# Patient Record
Sex: FEMALE | Race: WHITE | Employment: OTHER | URBAN - METROPOLITAN AREA
[De-identification: names, ages, dates, MRNs, and addresses within clinical notes are randomized per-mention and may not be internally consistent; named-entity substitution may affect disease eponyms.]

---

## 2024-08-09 ENCOUNTER — APPOINTMENT (OUTPATIENT)
Dept: CT IMAGING | Age: 89
DRG: 481 | End: 2024-08-09
Payer: COMMERCIAL

## 2024-08-09 ENCOUNTER — ANESTHESIA (OUTPATIENT)
Dept: OPERATING ROOM | Age: 89
End: 2024-08-09
Payer: COMMERCIAL

## 2024-08-09 ENCOUNTER — APPOINTMENT (OUTPATIENT)
Dept: GENERAL RADIOLOGY | Age: 89
DRG: 481 | End: 2024-08-09
Payer: COMMERCIAL

## 2024-08-09 ENCOUNTER — HOSPITAL ENCOUNTER (INPATIENT)
Age: 89
LOS: 5 days | Discharge: NURSING, REHAB OR LONG-TERM CARE | DRG: 481 | End: 2024-08-14
Attending: FAMILY MEDICINE | Admitting: FAMILY MEDICINE
Payer: COMMERCIAL

## 2024-08-09 ENCOUNTER — ANESTHESIA EVENT (OUTPATIENT)
Dept: OPERATING ROOM | Age: 89
End: 2024-08-09
Payer: COMMERCIAL

## 2024-08-09 DIAGNOSIS — K11.8 PAROTID NODULE: ICD-10-CM

## 2024-08-09 DIAGNOSIS — I71.43 INFRARENAL ABDOMINAL AORTIC ANEURYSM (AAA) WITHOUT RUPTURE (HCC): ICD-10-CM

## 2024-08-09 DIAGNOSIS — Z72.0 TOBACCO USE: ICD-10-CM

## 2024-08-09 DIAGNOSIS — J44.1 COPD WITH ACUTE EXACERBATION (HCC): ICD-10-CM
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
## 2024-08-09 DIAGNOSIS — J96.02 ACUTE RESPIRATORY FAILURE WITH HYPOXIA AND HYPERCAPNIA (HCC): ICD-10-CM

## 2024-08-09 DIAGNOSIS — J96.01 ACUTE RESPIRATORY FAILURE WITH HYPOXIA AND HYPERCAPNIA (HCC): ICD-10-CM

## 2024-08-09 DIAGNOSIS — S32.040A CLOSED COMPRESSION FRACTURE OF L4 LUMBAR VERTEBRA, INITIAL ENCOUNTER (HCC): ICD-10-CM

## 2024-08-09 DIAGNOSIS — S72.001A CLOSED FRACTURE OF NECK OF RIGHT FEMUR, INITIAL ENCOUNTER (HCC): ICD-10-CM

## 2024-08-09 LAB
ALBUMIN SERPL-MCNC: 4.1 G/DL (ref 3.4–5)
ALBUMIN/GLOB SERPL: 1.4 {RATIO} (ref 1.1–2.2)
ALP SERPL-CCNC: 83 U/L (ref 40–129)
ALT SERPL-CCNC: 16 U/L (ref 10–40)
ANION GAP SERPL CALCULATED.3IONS-SCNC: 11 MMOL/L (ref 3–16)
AST SERPL-CCNC: 23 U/L (ref 15–37)
BASE EXCESS BLDV CALC-SCNC: 2.4 MMOL/L
BASOPHILS # BLD: 0 K/UL (ref 0–0.2)
BASOPHILS NFR BLD: 0.3 %
BILIRUB SERPL-MCNC: 0.5 MG/DL (ref 0–1)
BUN SERPL-MCNC: 24 MG/DL (ref 7–20)
CALCIUM SERPL-MCNC: 9.5 MG/DL (ref 8.3–10.6)
CHLORIDE SERPL-SCNC: 105 MMOL/L (ref 99–110)
CK SERPL-CCNC: 31 U/L (ref 26–192)
CO2 BLDV-SCNC: 31 MMOL/L
CO2 SERPL-SCNC: 27 MMOL/L (ref 21–32)
COHGB MFR BLDV: 2 %
CREAT SERPL-MCNC: 0.7 MG/DL (ref 0.6–1.2)
DEPRECATED RDW RBC AUTO: 17.5 % (ref 12.4–15.4)
EKG ATRIAL RATE: 70 BPM
EKG DIAGNOSIS: NORMAL
EKG P AXIS: 80 DEGREES
EKG P-R INTERVAL: 120 MS
EKG Q-T INTERVAL: 436 MS
EKG QRS DURATION: 72 MS
EKG QTC CALCULATION (BAZETT): 470 MS
EKG R AXIS: 66 DEGREES
EKG T AXIS: 182 DEGREES
EKG VENTRICULAR RATE: 70 BPM
EOSINOPHIL # BLD: 0 K/UL (ref 0–0.6)
EOSINOPHIL NFR BLD: 0.3 %
GFR SERPLBLD CREATININE-BSD FMLA CKD-EPI: 83 ML/MIN/{1.73_M2}
GLUCOSE SERPL-MCNC: 104 MG/DL (ref 70–99)
HCO3 BLDV-SCNC: 29 MMOL/L (ref 23–29)
HCT VFR BLD AUTO: 39.8 % (ref 36–48)
HGB BLD-MCNC: 12.9 G/DL (ref 12–16)
LYMPHOCYTES # BLD: 1.4 K/UL (ref 1–5.1)
LYMPHOCYTES NFR BLD: 12.3 %
MCH RBC QN AUTO: 29.1 PG (ref 26–34)
MCHC RBC AUTO-ENTMCNC: 32.5 G/DL (ref 31–36)
MCV RBC AUTO: 89.5 FL (ref 80–100)
METHGB MFR BLDV: 0.3 %
MONOCYTES # BLD: 0.6 K/UL (ref 0–1.3)
MONOCYTES NFR BLD: 5.6 %
NEUTROPHILS # BLD: 9.2 K/UL (ref 1.7–7.7)
NEUTROPHILS NFR BLD: 81.5 %
NT-PROBNP SERPL-MCNC: 690 PG/ML (ref 0–449)
O2 THERAPY: ABNORMAL
PCO2 BLDV: 53.3 MMHG (ref 40–50)
PH BLDV: 7.35 [PH] (ref 7.35–7.45)
PLATELET # BLD AUTO: 259 K/UL (ref 135–450)
PMV BLD AUTO: 8 FL (ref 5–10.5)
PO2 BLDV: <30 MMHG
POTASSIUM SERPL-SCNC: 3.8 MMOL/L (ref 3.5–5.1)
PROT SERPL-MCNC: 7 G/DL (ref 6.4–8.2)
RBC # BLD AUTO: 4.44 M/UL (ref 4–5.2)
SAO2 % BLDV: 13 %
SARS-COV-2 RDRP RESP QL NAA+PROBE: NOT DETECTED
SODIUM SERPL-SCNC: 143 MMOL/L (ref 136–145)
TROPONIN, HIGH SENSITIVITY: 19 NG/L (ref 0–14)
TROPONIN, HIGH SENSITIVITY: 22 NG/L (ref 0–14)
WBC # BLD AUTO: 11.3 K/UL (ref 4–11)

## 2024-08-09 PROCEDURE — 96375 TX/PRO/DX INJ NEW DRUG ADDON: CPT

## 2024-08-09 PROCEDURE — 0QH634Z INSERTION OF INTERNAL FIXATION DEVICE INTO RIGHT UPPER FEMUR, PERCUTANEOUS APPROACH: ICD-10-PCS | Performed by: ORTHOPAEDIC SURGERY

## 2024-08-09 PROCEDURE — 2580000003 HC RX 258: Performed by: ORTHOPAEDIC SURGERY

## 2024-08-09 PROCEDURE — 84484 ASSAY OF TROPONIN QUANT: CPT

## 2024-08-09 PROCEDURE — 3700000001 HC ADD 15 MINUTES (ANESTHESIA): Performed by: ORTHOPAEDIC SURGERY

## 2024-08-09 PROCEDURE — 94761 N-INVAS EAR/PLS OXIMETRY MLT: CPT

## 2024-08-09 PROCEDURE — 82803 BLOOD GASES ANY COMBINATION: CPT

## 2024-08-09 PROCEDURE — 93010 ELECTROCARDIOGRAM REPORT: CPT | Performed by: INTERNAL MEDICINE

## 2024-08-09 PROCEDURE — 71045 X-RAY EXAM CHEST 1 VIEW: CPT

## 2024-08-09 PROCEDURE — 73552 X-RAY EXAM OF FEMUR 2/>: CPT

## 2024-08-09 PROCEDURE — 6370000000 HC RX 637 (ALT 250 FOR IP): Performed by: GENERAL ACUTE CARE HOSPITAL

## 2024-08-09 PROCEDURE — 99285 EMERGENCY DEPT VISIT HI MDM: CPT

## 2024-08-09 PROCEDURE — 2709999900 HC NON-CHARGEABLE SUPPLY: Performed by: ORTHOPAEDIC SURGERY

## 2024-08-09 PROCEDURE — 3600000004 HC SURGERY LEVEL 4 BASE: Performed by: ORTHOPAEDIC SURGERY

## 2024-08-09 PROCEDURE — A4217 STERILE WATER/SALINE, 500 ML: HCPCS | Performed by: ORTHOPAEDIC SURGERY

## 2024-08-09 PROCEDURE — 93005 ELECTROCARDIOGRAM TRACING: CPT | Performed by: GENERAL ACUTE CARE HOSPITAL

## 2024-08-09 PROCEDURE — 70450 CT HEAD/BRAIN W/O DYE: CPT

## 2024-08-09 PROCEDURE — 7100000000 HC PACU RECOVERY - FIRST 15 MIN: Performed by: ORTHOPAEDIC SURGERY

## 2024-08-09 PROCEDURE — C1713 ANCHOR/SCREW BN/BN,TIS/BN: HCPCS | Performed by: ORTHOPAEDIC SURGERY

## 2024-08-09 PROCEDURE — 87635 SARS-COV-2 COVID-19 AMP PRB: CPT

## 2024-08-09 PROCEDURE — 27235 TREAT THIGH FRACTURE: CPT | Performed by: ORTHOPAEDIC SURGERY

## 2024-08-09 PROCEDURE — 80053 COMPREHEN METABOLIC PANEL: CPT

## 2024-08-09 PROCEDURE — 72192 CT PELVIS W/O DYE: CPT

## 2024-08-09 PROCEDURE — 3700000000 HC ANESTHESIA ATTENDED CARE: Performed by: ORTHOPAEDIC SURGERY

## 2024-08-09 PROCEDURE — 5A09357 ASSISTANCE WITH RESPIRATORY VENTILATION, LESS THAN 24 CONSECUTIVE HOURS, CONTINUOUS POSITIVE AIRWAY PRESSURE: ICD-10-PCS | Performed by: FAMILY MEDICINE

## 2024-08-09 PROCEDURE — 6370000000 HC RX 637 (ALT 250 FOR IP): Performed by: ANESTHESIOLOGY

## 2024-08-09 PROCEDURE — 99223 1ST HOSP IP/OBS HIGH 75: CPT | Performed by: ORTHOPAEDIC SURGERY

## 2024-08-09 PROCEDURE — 73502 X-RAY EXAM HIP UNI 2-3 VIEWS: CPT

## 2024-08-09 PROCEDURE — 2580000003 HC RX 258

## 2024-08-09 PROCEDURE — 72125 CT NECK SPINE W/O DYE: CPT

## 2024-08-09 PROCEDURE — 85025 COMPLETE CBC W/AUTO DIFF WBC: CPT

## 2024-08-09 PROCEDURE — 2500000003 HC RX 250 WO HCPCS

## 2024-08-09 PROCEDURE — 3600000014 HC SURGERY LEVEL 4 ADDTL 15MIN: Performed by: ORTHOPAEDIC SURGERY

## 2024-08-09 PROCEDURE — 2580000003 HC RX 258: Performed by: GENERAL ACUTE CARE HOSPITAL

## 2024-08-09 PROCEDURE — 6360000002 HC RX W HCPCS: Performed by: ORTHOPAEDIC SURGERY

## 2024-08-09 PROCEDURE — 96374 THER/PROPH/DIAG INJ IV PUSH: CPT

## 2024-08-09 PROCEDURE — 2700000000 HC OXYGEN THERAPY PER DAY

## 2024-08-09 PROCEDURE — 94640 AIRWAY INHALATION TREATMENT: CPT

## 2024-08-09 PROCEDURE — 83880 ASSAY OF NATRIURETIC PEPTIDE: CPT

## 2024-08-09 PROCEDURE — 6360000002 HC RX W HCPCS

## 2024-08-09 PROCEDURE — 7100000001 HC PACU RECOVERY - ADDTL 15 MIN: Performed by: ORTHOPAEDIC SURGERY

## 2024-08-09 PROCEDURE — 94150 VITAL CAPACITY TEST: CPT

## 2024-08-09 PROCEDURE — 1200000000 HC SEMI PRIVATE

## 2024-08-09 PROCEDURE — 82550 ASSAY OF CK (CPK): CPT

## 2024-08-09 PROCEDURE — 6360000002 HC RX W HCPCS: Performed by: GENERAL ACUTE CARE HOSPITAL

## 2024-08-09 PROCEDURE — C1769 GUIDE WIRE: HCPCS | Performed by: ORTHOPAEDIC SURGERY

## 2024-08-09 PROCEDURE — 96361 HYDRATE IV INFUSION ADD-ON: CPT

## 2024-08-09 DEVICE — CANNULATED SCREW
Type: IMPLANTABLE DEVICE | Site: HIP | Status: FUNCTIONAL
Brand: ASNIS

## 2024-08-09 RX ORDER — SODIUM CHLORIDE 9 MG/ML
INJECTION, SOLUTION INTRAVENOUS PRN
Status: DISCONTINUED | OUTPATIENT
Start: 2024-08-09 | End: 2024-08-10 | Stop reason: SDUPTHER

## 2024-08-09 RX ORDER — SODIUM CHLORIDE 0.9 % (FLUSH) 0.9 %
5-40 SYRINGE (ML) INJECTION EVERY 12 HOURS SCHEDULED
Status: DISCONTINUED | OUTPATIENT
Start: 2024-08-09 | End: 2024-08-09 | Stop reason: HOSPADM

## 2024-08-09 RX ORDER — SODIUM CHLORIDE 0.9 % (FLUSH) 0.9 %
5-40 SYRINGE (ML) INJECTION EVERY 12 HOURS SCHEDULED
Status: DISCONTINUED | OUTPATIENT
Start: 2024-08-09 | End: 2024-08-10 | Stop reason: SDUPTHER

## 2024-08-09 RX ORDER — ENOXAPARIN SODIUM 100 MG/ML
40 INJECTION SUBCUTANEOUS DAILY
Status: DISCONTINUED | OUTPATIENT
Start: 2024-08-09 | End: 2024-08-09 | Stop reason: DRUGHIGH

## 2024-08-09 RX ORDER — ACETAMINOPHEN 650 MG/1
650 SUPPOSITORY RECTAL EVERY 6 HOURS PRN
Status: DISCONTINUED | OUTPATIENT
Start: 2024-08-09 | End: 2024-08-14 | Stop reason: HOSPADM

## 2024-08-09 RX ORDER — SODIUM CHLORIDE 0.9 % (FLUSH) 0.9 %
5-40 SYRINGE (ML) INJECTION EVERY 12 HOURS SCHEDULED
Status: DISCONTINUED | OUTPATIENT
Start: 2024-08-09 | End: 2024-08-14 | Stop reason: HOSPADM

## 2024-08-09 RX ORDER — SODIUM CHLORIDE 0.9 % (FLUSH) 0.9 %
5-40 SYRINGE (ML) INJECTION PRN
Status: DISCONTINUED | OUTPATIENT
Start: 2024-08-09 | End: 2024-08-09 | Stop reason: HOSPADM

## 2024-08-09 RX ORDER — IPRATROPIUM BROMIDE AND ALBUTEROL SULFATE 2.5; .5 MG/3ML; MG/3ML
1 SOLUTION RESPIRATORY (INHALATION) ONCE
Status: COMPLETED | OUTPATIENT
Start: 2024-08-09 | End: 2024-08-09

## 2024-08-09 RX ORDER — MAGNESIUM SULFATE IN WATER 40 MG/ML
2000 INJECTION, SOLUTION INTRAVENOUS PRN
Status: DISCONTINUED | OUTPATIENT
Start: 2024-08-09 | End: 2024-08-14 | Stop reason: HOSPADM

## 2024-08-09 RX ORDER — 0.9 % SODIUM CHLORIDE 0.9 %
500 INTRAVENOUS SOLUTION INTRAVENOUS ONCE
Status: COMPLETED | OUTPATIENT
Start: 2024-08-09 | End: 2024-08-09

## 2024-08-09 RX ORDER — POLYETHYLENE GLYCOL 3350 17 G/17G
17 POWDER, FOR SOLUTION ORAL DAILY PRN
Status: DISCONTINUED | OUTPATIENT
Start: 2024-08-09 | End: 2024-08-14 | Stop reason: HOSPADM

## 2024-08-09 RX ORDER — SODIUM CHLORIDE 9 MG/ML
INJECTION, SOLUTION INTRAVENOUS CONTINUOUS PRN
Status: DISCONTINUED | OUTPATIENT
Start: 2024-08-09 | End: 2024-08-09 | Stop reason: SDUPTHER

## 2024-08-09 RX ORDER — NALOXONE HYDROCHLORIDE 0.4 MG/ML
INJECTION, SOLUTION INTRAMUSCULAR; INTRAVENOUS; SUBCUTANEOUS PRN
Status: DISCONTINUED | OUTPATIENT
Start: 2024-08-09 | End: 2024-08-09 | Stop reason: HOSPADM

## 2024-08-09 RX ORDER — DEXAMETHASONE SODIUM PHOSPHATE 4 MG/ML
10 INJECTION, SOLUTION INTRA-ARTICULAR; INTRALESIONAL; INTRAMUSCULAR; INTRAVENOUS; SOFT TISSUE ONCE
Status: COMPLETED | OUTPATIENT
Start: 2024-08-09 | End: 2024-08-09

## 2024-08-09 RX ORDER — ACETAMINOPHEN 325 MG/1
650 TABLET ORAL EVERY 6 HOURS PRN
Status: DISCONTINUED | OUTPATIENT
Start: 2024-08-09 | End: 2024-08-14 | Stop reason: HOSPADM

## 2024-08-09 RX ORDER — FENTANYL CITRATE 50 UG/ML
INJECTION, SOLUTION INTRAMUSCULAR; INTRAVENOUS PRN
Status: DISCONTINUED | OUTPATIENT
Start: 2024-08-09 | End: 2024-08-09 | Stop reason: SDUPTHER

## 2024-08-09 RX ORDER — ENOXAPARIN SODIUM 100 MG/ML
30 INJECTION SUBCUTANEOUS DAILY
Status: DISCONTINUED | OUTPATIENT
Start: 2024-08-09 | End: 2024-08-11

## 2024-08-09 RX ORDER — ONDANSETRON 4 MG/1
4 TABLET, ORALLY DISINTEGRATING ORAL EVERY 8 HOURS PRN
Status: DISCONTINUED | OUTPATIENT
Start: 2024-08-09 | End: 2024-08-14 | Stop reason: HOSPADM

## 2024-08-09 RX ORDER — ENOXAPARIN SODIUM 100 MG/ML
30 INJECTION SUBCUTANEOUS DAILY
Status: DISCONTINUED | OUTPATIENT
Start: 2024-08-10 | End: 2024-08-09

## 2024-08-09 RX ORDER — PROPOFOL 10 MG/ML
INJECTION, EMULSION INTRAVENOUS PRN
Status: DISCONTINUED | OUTPATIENT
Start: 2024-08-09 | End: 2024-08-09 | Stop reason: SDUPTHER

## 2024-08-09 RX ORDER — BUDESONIDE 0.5 MG/2ML
0.5 INHALANT ORAL
Status: DISCONTINUED | OUTPATIENT
Start: 2024-08-09 | End: 2024-08-14 | Stop reason: HOSPADM

## 2024-08-09 RX ORDER — SUCCINYLCHOLINE CHLORIDE 20 MG/ML
INJECTION INTRAMUSCULAR; INTRAVENOUS PRN
Status: DISCONTINUED | OUTPATIENT
Start: 2024-08-09 | End: 2024-08-09 | Stop reason: SDUPTHER

## 2024-08-09 RX ORDER — ROCURONIUM BROMIDE 10 MG/ML
INJECTION, SOLUTION INTRAVENOUS PRN
Status: DISCONTINUED | OUTPATIENT
Start: 2024-08-09 | End: 2024-08-09 | Stop reason: SDUPTHER

## 2024-08-09 RX ORDER — MORPHINE SULFATE 2 MG/ML
2 INJECTION, SOLUTION INTRAMUSCULAR; INTRAVENOUS ONCE
Status: COMPLETED | OUTPATIENT
Start: 2024-08-09 | End: 2024-08-09

## 2024-08-09 RX ORDER — POTASSIUM CHLORIDE 20 MEQ/1
40 TABLET, EXTENDED RELEASE ORAL PRN
Status: DISCONTINUED | OUTPATIENT
Start: 2024-08-09 | End: 2024-08-14 | Stop reason: HOSPADM

## 2024-08-09 RX ORDER — SODIUM CHLORIDE 0.9 % (FLUSH) 0.9 %
5-40 SYRINGE (ML) INJECTION PRN
Status: DISCONTINUED | OUTPATIENT
Start: 2024-08-09 | End: 2024-08-14 | Stop reason: HOSPADM

## 2024-08-09 RX ORDER — FENTANYL CITRATE 0.05 MG/ML
25 INJECTION, SOLUTION INTRAMUSCULAR; INTRAVENOUS EVERY 5 MIN PRN
Status: DISCONTINUED | OUTPATIENT
Start: 2024-08-09 | End: 2024-08-09 | Stop reason: HOSPADM

## 2024-08-09 RX ORDER — LIDOCAINE HYDROCHLORIDE 20 MG/ML
INJECTION, SOLUTION INFILTRATION; PERINEURAL PRN
Status: DISCONTINUED | OUTPATIENT
Start: 2024-08-09 | End: 2024-08-09 | Stop reason: SDUPTHER

## 2024-08-09 RX ORDER — POTASSIUM CHLORIDE 7.45 MG/ML
10 INJECTION INTRAVENOUS PRN
Status: DISCONTINUED | OUTPATIENT
Start: 2024-08-09 | End: 2024-08-14 | Stop reason: HOSPADM

## 2024-08-09 RX ORDER — DEXAMETHASONE SODIUM PHOSPHATE 4 MG/ML
INJECTION, SOLUTION INTRA-ARTICULAR; INTRALESIONAL; INTRAMUSCULAR; INTRAVENOUS; SOFT TISSUE PRN
Status: DISCONTINUED | OUTPATIENT
Start: 2024-08-09 | End: 2024-08-09 | Stop reason: SDUPTHER

## 2024-08-09 RX ORDER — ONDANSETRON 2 MG/ML
INJECTION INTRAMUSCULAR; INTRAVENOUS PRN
Status: DISCONTINUED | OUTPATIENT
Start: 2024-08-09 | End: 2024-08-09 | Stop reason: SDUPTHER

## 2024-08-09 RX ORDER — SODIUM CHLORIDE 9 MG/ML
INJECTION, SOLUTION INTRAVENOUS PRN
Status: DISCONTINUED | OUTPATIENT
Start: 2024-08-09 | End: 2024-08-14 | Stop reason: HOSPADM

## 2024-08-09 RX ORDER — SODIUM CHLORIDE 0.9 % (FLUSH) 0.9 %
5-40 SYRINGE (ML) INJECTION PRN
Status: DISCONTINUED | OUTPATIENT
Start: 2024-08-09 | End: 2024-08-10 | Stop reason: SDUPTHER

## 2024-08-09 RX ORDER — MAGNESIUM HYDROXIDE 1200 MG/15ML
LIQUID ORAL CONTINUOUS PRN
Status: COMPLETED | OUTPATIENT
Start: 2024-08-09 | End: 2024-08-09

## 2024-08-09 RX ORDER — ONDANSETRON 2 MG/ML
4 INJECTION INTRAMUSCULAR; INTRAVENOUS
Status: DISCONTINUED | OUTPATIENT
Start: 2024-08-09 | End: 2024-08-09 | Stop reason: HOSPADM

## 2024-08-09 RX ORDER — ONDANSETRON 2 MG/ML
4 INJECTION INTRAMUSCULAR; INTRAVENOUS ONCE
Status: COMPLETED | OUTPATIENT
Start: 2024-08-09 | End: 2024-08-09

## 2024-08-09 RX ORDER — CEFAZOLIN SODIUM 1 G/3ML
INJECTION, POWDER, FOR SOLUTION INTRAMUSCULAR; INTRAVENOUS PRN
Status: DISCONTINUED | OUTPATIENT
Start: 2024-08-09 | End: 2024-08-09 | Stop reason: SDUPTHER

## 2024-08-09 RX ORDER — SODIUM CHLORIDE 450 MG/100ML
INJECTION, SOLUTION INTRAVENOUS CONTINUOUS
Status: DISCONTINUED | OUTPATIENT
Start: 2024-08-09 | End: 2024-08-13

## 2024-08-09 RX ORDER — FENTANYL CITRATE 50 UG/ML
25 INJECTION, SOLUTION INTRAMUSCULAR; INTRAVENOUS ONCE
Status: COMPLETED | OUTPATIENT
Start: 2024-08-09 | End: 2024-08-09

## 2024-08-09 RX ORDER — ALBUTEROL SULFATE 90 UG/1
2 AEROSOL, METERED RESPIRATORY (INHALATION) EVERY 6 HOURS PRN
COMMUNITY

## 2024-08-09 RX ORDER — SODIUM CHLORIDE 9 MG/ML
INJECTION, SOLUTION INTRAVENOUS PRN
Status: DISCONTINUED | OUTPATIENT
Start: 2024-08-09 | End: 2024-08-09 | Stop reason: HOSPADM

## 2024-08-09 RX ORDER — ONDANSETRON 2 MG/ML
4 INJECTION INTRAMUSCULAR; INTRAVENOUS EVERY 6 HOURS PRN
Status: DISCONTINUED | OUTPATIENT
Start: 2024-08-09 | End: 2024-08-14 | Stop reason: HOSPADM

## 2024-08-09 RX ADMIN — FENTANYL CITRATE 25 MCG: 50 INJECTION INTRAMUSCULAR; INTRAVENOUS at 13:35

## 2024-08-09 RX ADMIN — SUCCINYLCHOLINE CHLORIDE 100 MG: 20 INJECTION, SOLUTION INTRAMUSCULAR; INTRAVENOUS at 13:36

## 2024-08-09 RX ADMIN — LIDOCAINE HYDROCHLORIDE 80 MG: 20 INJECTION, SOLUTION INFILTRATION; PERINEURAL at 13:36

## 2024-08-09 RX ADMIN — IPRATROPIUM BROMIDE AND ALBUTEROL SULFATE 1 DOSE: 2.5; .5 SOLUTION RESPIRATORY (INHALATION) at 12:35

## 2024-08-09 RX ADMIN — DEXAMETHASONE SODIUM PHOSPHATE 4 MG: 4 INJECTION, SOLUTION INTRAMUSCULAR; INTRAVENOUS at 13:49

## 2024-08-09 RX ADMIN — FENTANYL CITRATE 25 MCG: 50 INJECTION INTRAMUSCULAR; INTRAVENOUS at 13:53

## 2024-08-09 RX ADMIN — FENTANYL CITRATE 25 MCG: 50 INJECTION INTRAMUSCULAR; INTRAVENOUS at 14:19

## 2024-08-09 RX ADMIN — IPRATROPIUM BROMIDE AND ALBUTEROL SULFATE 1 DOSE: 2.5; .5 SOLUTION RESPIRATORY (INHALATION) at 07:47

## 2024-08-09 RX ADMIN — ONDANSETRON 4 MG: 2 INJECTION INTRAMUSCULAR; INTRAVENOUS at 09:42

## 2024-08-09 RX ADMIN — DEXAMETHASONE SODIUM PHOSPHATE 10 MG: 4 INJECTION, SOLUTION INTRAMUSCULAR; INTRAVENOUS at 08:47

## 2024-08-09 RX ADMIN — SODIUM CHLORIDE 500 ML: 9 INJECTION, SOLUTION INTRAVENOUS at 08:47

## 2024-08-09 RX ADMIN — ONDANSETRON 4 MG: 2 INJECTION INTRAMUSCULAR; INTRAVENOUS at 14:05

## 2024-08-09 RX ADMIN — SODIUM CHLORIDE: 4.5 INJECTION, SOLUTION INTRAVENOUS at 15:39

## 2024-08-09 RX ADMIN — CEFAZOLIN 2000 MG: 2 INJECTION, POWDER, FOR SOLUTION INTRAVENOUS at 21:31

## 2024-08-09 RX ADMIN — ROCURONIUM BROMIDE 20 MG: 10 SOLUTION INTRAVENOUS at 13:53

## 2024-08-09 RX ADMIN — SUGAMMADEX 100 MG: 100 INJECTION, SOLUTION INTRAVENOUS at 14:12

## 2024-08-09 RX ADMIN — SODIUM CHLORIDE: 9 INJECTION, SOLUTION INTRAVENOUS at 13:30

## 2024-08-09 RX ADMIN — BUDESONIDE 500 MCG: 0.5 SUSPENSION RESPIRATORY (INHALATION) at 20:59

## 2024-08-09 RX ADMIN — MORPHINE SULFATE 2 MG: 2 INJECTION, SOLUTION INTRAMUSCULAR; INTRAVENOUS at 09:39

## 2024-08-09 RX ADMIN — ENOXAPARIN SODIUM 30 MG: 100 INJECTION SUBCUTANEOUS at 17:10

## 2024-08-09 RX ADMIN — CEFAZOLIN SODIUM 2 G: 1 INJECTION, POWDER, FOR SOLUTION INTRAMUSCULAR; INTRAVENOUS at 13:45

## 2024-08-09 RX ADMIN — PROPOFOL 80 MG: 10 INJECTION, EMULSION INTRAVENOUS at 13:36

## 2024-08-09 RX ADMIN — FENTANYL CITRATE 25 MCG: 50 INJECTION, SOLUTION INTRAMUSCULAR; INTRAVENOUS at 08:48

## 2024-08-09 ASSESSMENT — PAIN SCALES - GENERAL
PAINLEVEL_OUTOF10: 4
PAINLEVEL_OUTOF10: 0
PAINLEVEL_OUTOF10: 3
PAINLEVEL_OUTOF10: 4
PAINLEVEL_OUTOF10: 6
PAINLEVEL_OUTOF10: 4

## 2024-08-09 ASSESSMENT — PAIN DESCRIPTION - DESCRIPTORS
DESCRIPTORS: DISCOMFORT
DESCRIPTORS: SHARP
DESCRIPTORS: DISCOMFORT

## 2024-08-09 ASSESSMENT — PAIN DESCRIPTION - ORIENTATION
ORIENTATION: RIGHT

## 2024-08-09 ASSESSMENT — PAIN DESCRIPTION - LOCATION
LOCATION: HIP

## 2024-08-09 ASSESSMENT — PAIN - FUNCTIONAL ASSESSMENT: PAIN_FUNCTIONAL_ASSESSMENT: 0-10

## 2024-08-09 ASSESSMENT — PAIN DESCRIPTION - FREQUENCY: FREQUENCY: CONTINUOUS

## 2024-08-09 ASSESSMENT — COPD QUESTIONNAIRES: CAT_SEVERITY: MODERATE

## 2024-08-09 ASSESSMENT — PAIN DESCRIPTION - ONSET: ONSET: ON-GOING

## 2024-08-09 ASSESSMENT — PAIN DESCRIPTION - PAIN TYPE: TYPE: SURGICAL PAIN

## 2024-08-09 NOTE — ANESTHESIA PRE PROCEDURE
[facility] Department of Anesthesiology  Pre-Anesthesia Evaluation/Consultation       Name:  Altagracia Gottlieb  : 1935  Age:  88 y.o.                                           MRN:  [id number]  Date: 2024           Surgeon: Surgeon(s):  Robby Olson MD    Procedure: Procedure(s):  RIGHT HIP PINNING     Allergies   Allergen Reactions    Gluten Diarrhea     Patient Active Problem List   Diagnosis    Closed fracture of neck of right femur (HCC)    Closed right hip fracture, initial encounter ([facility])     No past medical history on file.  No past surgical history on file.     Medications  No current facility-administered medications on file prior to encounter.     Current Outpatient Medications on File Prior to Encounter   Medication Sig Dispense Refill    albuterol sulfate HFA (VENTOLIN HFA) 108 (90 Base) MCG/ACT inhaler Inhale 2 puffs into the lungs every 6 hours as needed for Wheezing       Current Facility-Administered Medications   Medication Dose Route Frequency Provider Last Rate Last Admin    sodium chloride flush 0.9 % injection 5-40 mL  5-40 mL IntraVENous 2 times per day Cade Gale MD        sodium chloride flush 0.9 % injection 5-40 mL  5-40 mL IntraVENous PRN Cade Gale MD        0.9 % sodium chloride infusion   IntraVENous PRN Cade Gale MD        potassium chloride (KLOR-CON M) extended release tablet 40 mEq  40 mEq Oral PRN Cade Gale MD        Or    potassium bicarb-citric acid (EFFER-K) effervescent tablet 40 mEq  40 mEq Oral PRN Cade Gale MD        Or    potassium chloride 10 mEq/100 mL IVPB (Peripheral Line)  10 mEq IntraVENous PRN Cade Gale MD        magnesium sulfate 2000 mg in 50 mL IVPB premix  2,000 mg IntraVENous PRN Cade Gale MD        [START ON 8/10/2024] enoxaparin Sodium (LOVENOX) injection 30 mg  30 mg SubCUTAneous Daily Cade Gale MD        ondansetron (ZOFRAN-ODT) disintegrating tablet 4 mg  4 mg Oral Q8H PRN Shahla

## 2024-08-09 NOTE — ANESTHESIA POSTPROCEDURE EVALUATION
[facility] Department of Anesthesiology  Post-Anesthesia Note       Name:  Altagracia Gottlieb                                  Age:  88 y.o.  MRN:  [id number]     Last Vitals & Oxygen Saturation: /87   Pulse 76   Temp 98.9 °F (37.2 °C)   Resp 16   Ht 1.575 m (5' 2\")   Wt 44.3 kg (97 lb 10.6 oz)   SpO2 99%   BMI 17.86 kg/m²   Patient Vitals for the past 4 hrs:   BP Temp Temp src Pulse Resp SpO2   08/09/24 1510 -- 98.9 °F (37.2 °C) -- -- -- --   08/09/24 1504 134/87 -- -- 76 16 99 %   08/09/24 1503 -- -- -- 78 15 98 %   08/09/24 1500 (!) 170/65 -- -- 79 17 92 %   08/09/24 1445 128/79 -- -- 82 17 98 %   08/09/24 1440 127/73 -- -- 81 15 95 %   08/09/24 1437 (!) 140/76 -- -- 78 12 90 %   08/09/24 1430 (!) 110/49 -- -- 73 (!) 9 96 %   08/09/24 1425 (!) 100/54 -- -- 74 11 97 %   08/09/24 1421 (!) 105/51 97.8 °F (36.6 °C) Temporal 75 12 98 %   08/09/24 1210 135/62 -- Temporal 83 20 91 %       Level of consciousness:  Awake, alert to baseline    Respiratory: Respirations easy, no distress. Stable.    Cardiovascular: Hemodynamically stable.    Hydration: Adequate.    PONV: Adequately managed.    Post-op pain: Adequately controlled.    Post-op assessment: Tolerated anesthetic well without complication.    Complications:  None.    Fredi Sullivan MD  August 9, 2024   3:22 PM

## 2024-08-09 NOTE — BRIEF OP NOTE
Brief Postoperative Note      Patient: Altagracia Gottlieb  YOB: 1935  MRN: [id number]    Date of Procedure: 8/9/2024    Pre-Op Diagnosis Codes:     * Closed fracture of right hip, initial encounter ([facility]) [S72.001A]    Post-Op Diagnosis: Same       Procedure(s):  RIGHT HIP PINNING    Surgeon(s):  Robby Olson MD    Assistant:  Surgical Assistant: Em Boyce    Anesthesia: General    Estimated Blood Loss (mL): Minimal    Complications: None    Specimens:   * No specimens in log *    Implants:  Implant Name Type Inv. Item Serial No.  Lot No. LRB No. Used Action   SCREW BNE L75MM DIA6.5MM THRD L20MM STD CANC TIFFANY TI ST - [id number]  SCREW BNE L75MM DIA6.5MM THRD L20MM STD CANC TIFFANY TI ST  [employer]-  Right 1 Implanted   SCREW BNE L80MM DIA6.5MM THRD L20MM STD CANC TIFFANY TI ST - [id number]  SCREW BNE L80MM DIA6.5MM THRD L20MM STD CANC TIFFANY TI ST  [employer] [facility]-  Right 1 Implanted         Drains: * No LDAs found *    Findings:  Infection Present At Time Of Surgery (PATOS) (choose all levels that have infection present):  No infection present  Other Findings: Same.    Electronically signed by Robby Olson MD on 8/9/2024 at 6:07 PM

## 2024-08-09 NOTE — CONSULTS
Denies bowel or bladder issues   MUSCULOSKELETAL: Right hip pain.  All other ROS reviewed in chart or with patient or family and are grossly negative.       PHYSICAL EXAMINATION:  Ms. Gottlieb is a very pleasant 88 y.o. female who seen today in no acute distress, awake, alert, and oriented.  She is well nourished and groomed.  Patient with normal affect. Body mass index is 17.86 kg/m².. Skin warm and dry. Resting respiratory rate is 16.  Resp deep and easy. Pulse is with regular rate and rhythm    BP (!) 113/56   Pulse 79   Temp 97.9 °F (36.6 °C) (Oral)   Resp 17   Ht 1.575 m (5' 2\")   Wt 44.3 kg (97 lb 10.6 oz)   SpO2 (!) 87%   BMI 17.86 kg/m²        Airway is intact  Chest: chest clear, no wheezing, rales, normal symmetric air entry, no tachypnea, retractions or cyanosis  Heart: regular rate and rhythm ; heart sounds normal   Hearing intact, pupil equal and reactive bilateral  Lymphatics; No groin or axillary enlarged lymph nodes.  Neck; No swelling  Abdomen; soft, non distended.     MUSCULOSKELETAL:   Right leg in external rotation with pain with ROM, Examination of both lower extrimiteis showing a decreased range of motion and muscle power of the right hip compare to the other side because of pain.  There is mild swelling that can be seen, and ecchymosis over the right hip, but no wound. She has intact sensation and good pedal pulses bilateral.  She has significant tenderness on deep palpation over the right hip.     Good strength, and no instability both upper and the other lower extremities.   NVI bilateral lower and upper extermities.     NEUROLOGIC:   Sensory:    Touch:                     Right Upper Extremity:  normal                   Left Upper Extremity:  normal                  Right Lower Extremity:  normal                  Left Lower Extremity:  normal        DATA:    CBC:   Lab Results   Component Value Date/Time    WBC 11.3 08/09/2024 08:17 AM    RBC 4.44 08/09/2024 08:17 AM    HGB 12.9

## 2024-08-09 NOTE — ED PROVIDER NOTES
presents to the emergency department today for evaluation of right hip pain after sustaining a fall this morning.  Patient lives at home with her daughter.  Patient states that she is supposed to use a cane or walker but admits that she is not always compliant with this.  Patient states that she is unsure of what exactly caused today's fall.  She does not recall feeling lightheaded or dizzy prior to this fall.  She reports falling from standing onto carpeted devi.  She states that she does not believe that she hit her head.  She denies loss of consciousness.  She is not anticoagulated.  Patient has history of COPD and is a current smoker.  She states that she uses an albuterol inhaler at home, denies other daily prescribed meds.  She admits that she has not seen a PCP in \"a long time\".  Patient denies headache.  She denies dizziness, blurred vision, or unilateral extremity weakness.  She denies any neck pain.  She denies chest pain.  She denies shortness of breath at rest but states that she sometimes feel short of breath with exertion.  She does not wear supplemental oxygen at home but was noted to be hypoxic with initial room air oxygen saturation of 82%.  She states that she does have an intermittent nonproductive cough.  She denies nasal congestion.  She denies recent travel or known sick contacts.  She currently reports a pain level of 8 out of 10.  He describes his pain as constant, dull, and aching with sharp pains that occur with minimal movement.  Patient states the pain radiates from her hip into her thigh.  She is unable to ambulate because of her symptoms.  She denies fever, chills, or other symptoms.  Physical exam complete.  Patient arrives nontoxic, afebrile, with initial BP of 159/81.  Patient was placed on supplemental oxygen at 3L per nasal cannula.  GCS is 15.  Patient is without any focal neurologic deficits.  Right hip is without erythema, edema, or ecchymosis.  Right lower extremity appears

## 2024-08-09 NOTE — H&P
V2.0  History and Physical      Name:  Altagraica Gottlieb /Age/Sex: 1935  (88 y.o. female)   MRN & CSN:  [id number] & [id number] Encounter Date/Time: 2024 12:39 PM EDT   Location:  [facility] PCP: No primary care provider on file.       Hospital Day: 1    Assessment and Plan:   Altagracia Gottlieb is a 88 y.o. female with a pmh of COPD who presents with Closed fracture of neck of right femur ([facility])    Hospital Problems             Last Modified POA    * (Principal) Closed fracture of neck of right femur ([facility]) 2024 Yes    Closed right hip fracture, initial encounter ([facility]) 2024 Yes       Plan:  Closed fracture of the neck of the right femur  Mechanical fall at home.  Orthopedic surgery consult  Bedrest until seen by orthopedic surgeon  N.p.o. until seen by orthopedic surgeon    2.  COPD exacerbation  Currently is on 2 L nasal cannula, usually patient is not on any oxygen at home.  Albuterol, ipratropium, budesonide breathing treatments      Disposition:   Current Living situation: Home  Expected Disposition: Home versus acute rehab  Estimated D/C: 2-3    Diet Diet NPO   DVT Prophylaxis [] Lovenox, []  Heparin, [x] SCDs, [] Ambulation,  [] Eliquis, [] Xarelto, [] Coumadin   Code Status DNR-CC   Surrogate Decision Maker/ POA      Personally reviewed Lab Studies and Imaging           History from:     patient, patient's daughter, ED physician    History of Present Illness:     Chief Complaint: Mechanical fall at home and fracture of the neck of the right femur and COPD exacerbation  Altagracia Gottlieb is a 88 y.o. female with pmh of COPD who presents with mechanical fall and fracture of neck of femur, and COPD exacerbation       Patient lives at her daughter's home.  Patient was up this morning at 6 AM going to the bathroom when she tripped and fell.  Patient presented to the ED and was found to have a fracture of the neck of the femur.  Patient was also found to have SpO2 of 80%.  Patient was placed on 2 L nasal

## 2024-08-10 LAB
BACTERIA URNS QL MICRO: ABNORMAL /HPF
BILIRUB UR QL STRIP.AUTO: NEGATIVE
CALCIUM OXALATE CRYSTALS: PRESENT
CLARITY UR: ABNORMAL
COLOR UR: YELLOW
EPI CELLS #/AREA URNS AUTO: 1 /HPF (ref 0–5)
GLUCOSE UR STRIP.AUTO-MCNC: NEGATIVE MG/DL
HCT VFR BLD AUTO: 35.9 % (ref 36–48)
HGB BLD-MCNC: 11.8 G/DL (ref 12–16)
HGB UR QL STRIP.AUTO: NEGATIVE
HYALINE CASTS #/AREA URNS AUTO: 0 /LPF (ref 0–8)
KETONES UR STRIP.AUTO-MCNC: ABNORMAL MG/DL
LEUKOCYTE ESTERASE UR QL STRIP.AUTO: ABNORMAL
NITRITE UR QL STRIP.AUTO: NEGATIVE
PH UR STRIP.AUTO: 5.5 [PH] (ref 5–8)
PROT UR STRIP.AUTO-MCNC: ABNORMAL MG/DL
RBC CLUMPS #/AREA URNS AUTO: 9 /HPF (ref 0–4)
SP GR UR STRIP.AUTO: 1.03 (ref 1–1.03)
UA COMPLETE W REFLEX CULTURE PNL UR: ABNORMAL
UA DIPSTICK W REFLEX MICRO PNL UR: YES
URN SPEC COLLECT METH UR: ABNORMAL
UROBILINOGEN UR STRIP-ACNC: 0.2 E.U./DL
WBC #/AREA URNS AUTO: 3 /HPF (ref 0–5)

## 2024-08-10 PROCEDURE — 85018 HEMOGLOBIN: CPT

## 2024-08-10 PROCEDURE — 97530 THERAPEUTIC ACTIVITIES: CPT

## 2024-08-10 PROCEDURE — 1200000000 HC SEMI PRIVATE

## 2024-08-10 PROCEDURE — 6370000000 HC RX 637 (ALT 250 FOR IP): Performed by: STUDENT IN AN ORGANIZED HEALTH CARE EDUCATION/TRAINING PROGRAM

## 2024-08-10 PROCEDURE — 97116 GAIT TRAINING THERAPY: CPT

## 2024-08-10 PROCEDURE — 97162 PT EVAL MOD COMPLEX 30 MIN: CPT

## 2024-08-10 PROCEDURE — 2580000003 HC RX 258: Performed by: ORTHOPAEDIC SURGERY

## 2024-08-10 PROCEDURE — 94761 N-INVAS EAR/PLS OXIMETRY MLT: CPT

## 2024-08-10 PROCEDURE — 97535 SELF CARE MNGMENT TRAINING: CPT

## 2024-08-10 PROCEDURE — 36415 COLL VENOUS BLD VENIPUNCTURE: CPT

## 2024-08-10 PROCEDURE — 85014 HEMATOCRIT: CPT

## 2024-08-10 PROCEDURE — 2700000000 HC OXYGEN THERAPY PER DAY

## 2024-08-10 PROCEDURE — 6360000002 HC RX W HCPCS: Performed by: ORTHOPAEDIC SURGERY

## 2024-08-10 PROCEDURE — 97166 OT EVAL MOD COMPLEX 45 MIN: CPT

## 2024-08-10 PROCEDURE — 94640 AIRWAY INHALATION TREATMENT: CPT

## 2024-08-10 PROCEDURE — 81001 URINALYSIS AUTO W/SCOPE: CPT

## 2024-08-10 PROCEDURE — 6370000000 HC RX 637 (ALT 250 FOR IP): Performed by: ORTHOPAEDIC SURGERY

## 2024-08-10 RX ORDER — METHOCARBAMOL 500 MG/1
500 TABLET, FILM COATED ORAL 3 TIMES DAILY PRN
Status: DISCONTINUED | OUTPATIENT
Start: 2024-08-10 | End: 2024-08-14 | Stop reason: HOSPADM

## 2024-08-10 RX ADMIN — ALBUTEROL SULFATE 2.5 MG: 2.5 SOLUTION RESPIRATORY (INHALATION) at 22:27

## 2024-08-10 RX ADMIN — METHOCARBAMOL TABLETS 500 MG: 500 TABLET, COATED ORAL at 15:57

## 2024-08-10 RX ADMIN — CEFAZOLIN 2000 MG: 2 INJECTION, POWDER, FOR SOLUTION INTRAVENOUS at 05:51

## 2024-08-10 RX ADMIN — SODIUM CHLORIDE: 4.5 INJECTION, SOLUTION INTRAVENOUS at 20:13

## 2024-08-10 RX ADMIN — ENOXAPARIN SODIUM 30 MG: 100 INJECTION SUBCUTANEOUS at 10:14

## 2024-08-10 RX ADMIN — METHOCARBAMOL TABLETS 500 MG: 500 TABLET, COATED ORAL at 05:59

## 2024-08-10 RX ADMIN — BUDESONIDE 500 MCG: 0.5 SUSPENSION RESPIRATORY (INHALATION) at 19:43

## 2024-08-10 RX ADMIN — SODIUM CHLORIDE, PRESERVATIVE FREE 10 ML: 5 INJECTION INTRAVENOUS at 10:16

## 2024-08-10 RX ADMIN — SODIUM CHLORIDE: 4.5 INJECTION, SOLUTION INTRAVENOUS at 05:50

## 2024-08-10 RX ADMIN — ALBUTEROL SULFATE 2.5 MG: 2.5 SOLUTION RESPIRATORY (INHALATION) at 11:28

## 2024-08-10 RX ADMIN — BUDESONIDE 500 MCG: 0.5 SUSPENSION RESPIRATORY (INHALATION) at 08:45

## 2024-08-10 RX ADMIN — METHOCARBAMOL TABLETS 500 MG: 500 TABLET, COATED ORAL at 22:46

## 2024-08-10 RX ADMIN — ACETAMINOPHEN 650 MG: 325 TABLET ORAL at 15:56

## 2024-08-10 RX ADMIN — ALBUTEROL SULFATE 2.5 MG: 2.5 SOLUTION RESPIRATORY (INHALATION) at 17:34

## 2024-08-10 ASSESSMENT — PAIN SCALES - GENERAL
PAINLEVEL_OUTOF10: 6
PAINLEVEL_OUTOF10: 6
PAINLEVEL_OUTOF10: 3
PAINLEVEL_OUTOF10: 4
PAINLEVEL_OUTOF10: 5

## 2024-08-10 ASSESSMENT — PAIN DESCRIPTION - LOCATION
LOCATION: ABDOMEN
LOCATION: HIP
LOCATION: HIP
LOCATION: ABDOMEN

## 2024-08-10 ASSESSMENT — PAIN DESCRIPTION - ORIENTATION
ORIENTATION: RIGHT
ORIENTATION: MID
ORIENTATION: MID
ORIENTATION: RIGHT

## 2024-08-10 ASSESSMENT — PAIN DESCRIPTION - DESCRIPTORS
DESCRIPTORS: SPASM
DESCRIPTORS: SPASM
DESCRIPTORS: DISCOMFORT

## 2024-08-10 ASSESSMENT — PAIN DESCRIPTION - PAIN TYPE: TYPE: SURGICAL PAIN

## 2024-08-10 ASSESSMENT — PAIN DESCRIPTION - ONSET
ONSET: ON-GOING
ONSET: ON-GOING

## 2024-08-10 ASSESSMENT — PAIN DESCRIPTION - FREQUENCY
FREQUENCY: INTERMITTENT

## 2024-08-10 NOTE — PLAN OF CARE
Problem: Discharge Planning  Goal: Discharge to home or other facility with appropriate resources  8/10/2024 0739 by Dudley Jackman RN  Outcome: Progressing  Flowsheets (Taken 8/9/2024 2132 by Angelic Owen, RN)  Discharge to home or other facility with appropriate resources:   Identify barriers to discharge with patient and caregiver   Arrange for needed discharge resources and transportation as appropriate   Identify discharge learning needs (meds, wound care, etc)   Arrange for interpreters to assist at discharge as needed  8/10/2024 0345 by Angelic Owen RN  Outcome: Progressing  Flowsheets (Taken 8/9/2024 2132)  Discharge to home or other facility with appropriate resources:   Identify barriers to discharge with patient and caregiver   Arrange for needed discharge resources and transportation as appropriate   Identify discharge learning needs (meds, wound care, etc)   Arrange for interpreters to assist at discharge as needed     Problem: Pain  Goal: Verbalizes/displays adequate comfort level or baseline comfort level  8/10/2024 0739 by Dudley Jackman RN  Outcome: Progressing  Flowsheets (Taken 8/10/2024 0739)  Verbalizes/displays adequate comfort level or baseline comfort level:   Encourage patient to monitor pain and request assistance   Administer analgesics based on type and severity of pain and evaluate response   Consider cultural and social influences on pain and pain management   Assess pain using appropriate pain scale   Implement non-pharmacological measures as appropriate and evaluate response  8/10/2024 0345 by Angelic Owen RN  Outcome: Progressing     Problem: Safety - Adult  Goal: Free from fall injury  8/10/2024 0739 by Dudley Jackman, RN  Outcome: Progressing  Flowsheets (Taken 8/10/2024 0739)  Free From Fall Injury: Instruct family/caregiver on patient safety  8/10/2024 0345 by Angelic Owen RN  Outcome: Progressing     Problem: Skin/Tissue Integrity  Goal: Absence

## 2024-08-10 NOTE — OP NOTE
[facility]          [address] [place] [address]                            OPERATIVE REPORT      PATIENT NAME: OUSMANE MCKENNA               : 1935  MED REC NO: [id number]                      ROOM: [place] [address]  ACCOUNT NO: [id number]                       ADMIT DATE: 2024  PROVIDER: Robby Olson MD      DATE OF PROCEDURE:  2024    SURGEON:  Robby Olson MD    ASSISTANT:  Em surgical assistant.    PREOPERATIVE DIAGNOSIS:  Right hip minimally displaced valgus impacted femoral neck fracture.    POSTOPERATIVE DIAGNOSIS:  Right hip minimally displaced valgus impacted femoral neck fracture.    PROCEDURE DONE:  Right hip percutaneous skeletal fixation with right hip pinning using cannulated screws for femoral neck fracture.    ANESTHESIA:  General anesthesia.    ESTIMATED BLOOD LOSS:  Minimal.    COMPLICATIONS:  None.    IMPLANT USED:  [place] titanium 6.5 cannulated Asnis screws x3.    INDICATION:  This is an 88-year-old white female who sustained a fall at home where she lives with her daughter and she started complaining of right hip pain.  She was brought by EMS to [facility] where she was found to have minimally displaced, impacted femoral neck fracture.  We recommended percutaneous pinning.  All risks, benefits, and alternatives were discussed with the patient and her daughter, and they agreed to proceed with surgical fixation.    DESCRIPTION OF PROCEDURE:  Patient's right hip was marked.  She received 2 g Ancef IV preoperatively.  The patient was then brought to the operating room, underwent general anesthesia.  The patient was then transferred to the [place] table with the right foot in a boot and the left leg in the leg bolster.  C-arm confirmed that the fracture was still in good valgus impacted position.  The right hip and lower extremity were then prepped and draped in regular sterile routine fashion.  A time-out was called

## 2024-08-10 NOTE — PLAN OF CARE
Problem: Discharge Planning  Goal: Discharge to home or other facility with appropriate resources  Outcome: Progressing  Flowsheets (Taken 8/9/2024 2132)  Discharge to home or other facility with appropriate resources:   Identify barriers to discharge with patient and caregiver   Arrange for needed discharge resources and transportation as appropriate   Identify discharge learning needs (meds, wound care, etc)   Arrange for interpreters to assist at discharge as needed     Problem: Pain  Goal: Verbalizes/displays adequate comfort level or baseline comfort level  Outcome: Progressing     Problem: Safety - Adult  Goal: Free from fall injury  Outcome: Progressing     Problem: Skin/Tissue Integrity  Goal: Absence of new skin breakdown  Description: 1.  Monitor for areas of redness and/or skin breakdown  2.  Assess vascular access sites hourly  3.  Every 4-6 hours minimum:  Change oxygen saturation probe site  4.  Every 4-6 hours:  If on nasal continuous positive airway pressure, respiratory therapy assess nares and determine need for appliance change or resting period.  Outcome: Progressing     Problem: ABCDS Injury Assessment  Goal: Absence of physical injury  Outcome: Progressing

## 2024-08-11 PROCEDURE — 1200000000 HC SEMI PRIVATE

## 2024-08-11 PROCEDURE — 6360000002 HC RX W HCPCS: Performed by: FAMILY MEDICINE

## 2024-08-11 PROCEDURE — 94761 N-INVAS EAR/PLS OXIMETRY MLT: CPT

## 2024-08-11 PROCEDURE — 2700000000 HC OXYGEN THERAPY PER DAY

## 2024-08-11 PROCEDURE — 2580000003 HC RX 258: Performed by: ORTHOPAEDIC SURGERY

## 2024-08-11 PROCEDURE — 6370000000 HC RX 637 (ALT 250 FOR IP): Performed by: FAMILY MEDICINE

## 2024-08-11 PROCEDURE — 6360000002 HC RX W HCPCS: Performed by: ORTHOPAEDIC SURGERY

## 2024-08-11 PROCEDURE — 94640 AIRWAY INHALATION TREATMENT: CPT

## 2024-08-11 RX ORDER — ALBUTEROL SULFATE 90 UG/1
2 AEROSOL, METERED RESPIRATORY (INHALATION) EVERY 4 HOURS PRN
Status: DISCONTINUED | OUTPATIENT
Start: 2024-08-11 | End: 2024-08-14 | Stop reason: HOSPADM

## 2024-08-11 RX ORDER — ALBUTEROL SULFATE 2.5 MG/3ML
2.5 SOLUTION RESPIRATORY (INHALATION) EVERY 4 HOURS PRN
Status: DISCONTINUED | OUTPATIENT
Start: 2024-08-11 | End: 2024-08-14 | Stop reason: HOSPADM

## 2024-08-11 RX ADMIN — SODIUM CHLORIDE: 4.5 INJECTION, SOLUTION INTRAVENOUS at 19:19

## 2024-08-11 RX ADMIN — ALBUTEROL SULFATE 2.5 MG: 2.5 SOLUTION RESPIRATORY (INHALATION) at 07:30

## 2024-08-11 RX ADMIN — RIVAROXABAN 10 MG: 10 TABLET, FILM COATED ORAL at 19:13

## 2024-08-11 RX ADMIN — ALBUTEROL SULFATE 2 PUFF: 90 AEROSOL, METERED RESPIRATORY (INHALATION) at 17:15

## 2024-08-11 RX ADMIN — BUDESONIDE 500 MCG: 0.5 SUSPENSION RESPIRATORY (INHALATION) at 19:44

## 2024-08-11 RX ADMIN — BUDESONIDE 500 MCG: 0.5 SUSPENSION RESPIRATORY (INHALATION) at 07:29

## 2024-08-11 RX ADMIN — SODIUM CHLORIDE, PRESERVATIVE FREE 10 ML: 5 INJECTION INTRAVENOUS at 21:10

## 2024-08-11 ASSESSMENT — PAIN SCALES - GENERAL: PAINLEVEL_OUTOF10: 0

## 2024-08-11 NOTE — DISCHARGE INSTR - COC
[facility] Continuity of Care Form    Patient Name:  Altagracia Gottlieb  : 1935    MRN:  [id number]    Admit date:  2024  Discharge date:  2024    Code Status Order: DNR-CC  Advance Directives: Yes    Admitting Physician: Cade Gale MD  PCP: No primary care provider on file.    Discharging Nurse: EVANS Nunez  Discharging Hospital Unit/Room#: J6H-6604/3121-01  Discharging Unit Phone Number: [phone]    Emergency Contact:        Past Surgical History:  Past Surgical History:   Procedure Laterality Date    HIP SURGERY Right 2024    RIGHT HIP PINNING performed by Robby Olson MD at [facility] OR       Immunization History:   Immunization History   Administered Date(s) Administered    COVID-19, PFIZER PURPLE top, DILUTE for use, (age 12 y+), 30mcg/0.3mL 2021, 08/15/2021       Active Problems:  Principal Problem:    Closed fracture of neck of right femur (HCC)  Active Problems:    Closed right hip fracture, initial encounter ([facility])  Resolved Problems:    * No resolved hospital problems. *      Isolation/Infection:       Nurse Assessment:  Last Vital Signs:BP (!) 142/70   Pulse 86   Temp 97.8 °F (36.6 °C) (Oral)   Resp 20   Ht 1.575 m (5' 2\")   Wt 47 kg (103 lb 9.9 oz)   SpO2 90%   BMI 18.95 kg/m²   Last documented pain score (0-10 scale): Pain Level: 3  Last Weight:   Wt Readings from Last 1 Encounters:   24 47 kg (103 lb 9.9 oz)     Mental Status:  oriented and alert     IV Access:  - None    Nursing Mobility/ADLs:  Walking   Assisted  Transfer  Assisted  Bathing  Assisted  Dressing  Assisted  Toileting  Assisted  Feeding  Independent  Med Admin  Dependent  Med Delivery   whole    Wound Care Documentation and Therapy:  Keep Mepilex dressing clean and intact for 7 days after surgery then remove and leave wound to air. Mepilex is waterproof for showering.         Elimination:  Urinary Catheter: None   Colostomy/Ileostomy: No  Continence:   Bowel: Yes  Bladder: Yes  Date of Last

## 2024-08-11 NOTE — PLAN OF CARE
Problem: Discharge Planning  Goal: Discharge to home or other facility with appropriate resources  Outcome: Progressing  Flowsheets  Taken 8/10/2024 2014 by Angelic Owen, RN  Discharge to home or other facility with appropriate resources:   Identify barriers to discharge with patient and caregiver   Arrange for needed discharge resources and transportation as appropriate   Identify discharge learning needs (meds, wound care, etc)  Taken 8/10/2024 1023 by Dudley Jackman RN  Discharge to home or other facility with appropriate resources:   Identify barriers to discharge with patient and caregiver   Identify discharge learning needs (meds, wound care, etc)     Problem: Pain  Goal: Verbalizes/displays adequate comfort level or baseline comfort level  Outcome: Progressing     Problem: Safety - Adult  Goal: Free from fall injury  Outcome: Progressing     Problem: Skin/Tissue Integrity  Goal: Absence of new skin breakdown  Description: 1.  Monitor for areas of redness and/or skin breakdown  2.  Assess vascular access sites hourly  3.  Every 4-6 hours minimum:  Change oxygen saturation probe site  4.  Every 4-6 hours:  If on nasal continuous positive airway pressure, respiratory therapy assess nares and determine need for appliance change or resting period.  Outcome: Progressing     Problem: ABCDS Injury Assessment  Goal: Absence of physical injury  Outcome: Progressing

## 2024-08-12 PROCEDURE — 6360000002 HC RX W HCPCS: Performed by: ORTHOPAEDIC SURGERY

## 2024-08-12 PROCEDURE — 94760 N-INVAS EAR/PLS OXIMETRY 1: CPT

## 2024-08-12 PROCEDURE — 97530 THERAPEUTIC ACTIVITIES: CPT

## 2024-08-12 PROCEDURE — 94660 CPAP INITIATION&MGMT: CPT

## 2024-08-12 PROCEDURE — 97535 SELF CARE MNGMENT TRAINING: CPT

## 2024-08-12 PROCEDURE — 1200000000 HC SEMI PRIVATE

## 2024-08-12 PROCEDURE — 2700000000 HC OXYGEN THERAPY PER DAY

## 2024-08-12 PROCEDURE — 97110 THERAPEUTIC EXERCISES: CPT

## 2024-08-12 PROCEDURE — 94640 AIRWAY INHALATION TREATMENT: CPT

## 2024-08-12 PROCEDURE — 6370000000 HC RX 637 (ALT 250 FOR IP): Performed by: FAMILY MEDICINE

## 2024-08-12 PROCEDURE — 97116 GAIT TRAINING THERAPY: CPT

## 2024-08-12 RX ADMIN — RIVAROXABAN 10 MG: 10 TABLET, FILM COATED ORAL at 17:50

## 2024-08-12 RX ADMIN — BUDESONIDE 500 MCG: 0.5 SUSPENSION RESPIRATORY (INHALATION) at 20:30

## 2024-08-12 ASSESSMENT — PAIN SCALES - GENERAL: PAINLEVEL_OUTOF10: 0

## 2024-08-12 NOTE — PLAN OF CARE
Problem: Discharge Planning  Goal: Discharge to home or other facility with appropriate resources  8/12/2024 1149 by Obdulio Young RN  Outcome: Progressing  Flowsheets (Taken 8/12/2024 1045)  Discharge to home or other facility with appropriate resources: Identify barriers to discharge with patient and caregiver     Problem: Pain  Goal: Verbalizes/displays adequate comfort level or baseline comfort level  8/12/2024 1149 by Obdulio Young RN  Outcome: Progressing     Problem: Safety - Adult  Goal: Free from fall injury  8/12/2024 1149 by Obdulio Young RN  Outcome: Progressing  Flowsheets (Taken 8/12/2024 1148)  Free From Fall Injury: Instruct family/caregiver on patient safety     Problem: Skin/Tissue Integrity  Goal: Absence of new skin breakdown  Description: 1.  Monitor for areas of redness and/or skin breakdown  2.  Assess vascular access sites hourly  3.  Every 4-6 hours minimum:  Change oxygen saturation probe site  4.  Every 4-6 hours:  If on nasal continuous positive airway pressure, respiratory therapy assess nares and determine need for appliance change or resting period.  8/12/2024 1149 by Obdulio Young RN  Outcome: Progressing     Problem: ABCDS Injury Assessment  Goal: Absence of physical injury  8/12/2024 1149 by Obdulio Young RN  Outcome: Progressing  Flowsheets (Taken 8/12/2024 1148)  Absence of Physical Injury: Implement safety measures based on patient assessment

## 2024-08-12 NOTE — ACP (ADVANCE CARE PLANNING)
Advance Care Planning     Advance Care Planning Activator (Inpatient)  Conversation Note      Date of ACP Conversation: 8/12/2024     Conversation Conducted with: Patient with Decision Making Capacity    ACP Activator: Elida Curran        Health Care Decision Maker:     Current Designated Health Care Decision Maker:     Primary Decision Maker: Omayra Cohen - Child - [phone]  Click here to complete Healthcare Decision Makers including section of the Healthcare Decision Maker Relationship (ie \"Primary\")      Care Preferences    Patient is a DNR-Cc    Length of ACP Conversation in minutes:      Conversation Outcomes:  ACP discussion completed    Follow-up plan:    [] Schedule follow-up conversation to continue planning  [] Referred individual to Provider for additional questions/concerns   [] Advised patient/agent/surrogate to review completed ACP document and update if needed with changes in condition, patient preferences or care setting    [] This note routed to one or more involved healthcare providers    Electronically signed by Elida Curran on 8/12/2024 at 12:36 PM  #[phone]

## 2024-08-12 NOTE — CARE COORDINATION
[facility] [place] and [facility] are able to accept patient  Call placed to daughter Ernestina to discuss and finalize choice  Left message requesting a return call  Electronically signed by Elida Curran on 8/12/2024 at 4:01 PM  #[phone]

## 2024-08-12 NOTE — CARE COORDINATION
08/12/24 1224   Service Assessment   Patient Orientation Alert and Oriented;Person;Place;Situation   Cognition Alert   History Provided By Patient   Primary Caregiver Self   Accompanied By/Relationship no one   Support Systems Children   Patient's Healthcare Decision Maker is: Legal Next of Kin   PCP Verified by CM No  (provided [facility] PCP list)   Prior Functional Level Independent in ADLs/IADLs   Current Functional Level Assistance with the following:;Mobility;Bathing;Dressing;Toileting;Cooking;Housework;Shopping   Can patient return to prior living arrangement No   Ability to make needs known: Good   Family able to assist with home care needs: No   Would you like for me to discuss the discharge plan with any other family members/significant others, and if so, who? Yes  (daughter Ernestina)   Financial Resources Other (Comment)   Community Resources None   CM/SW Referral Other (see comment)  (dc needs)   Discharge Planning   Type of Residence House   Living Arrangements Children   Current Services Prior To Admission Durable Medical Equipment   Current DME Prior to Arrival Walker;Other (Comment)  (grab bars in shower and 4WW)   Potential Assistance Needed Skilled Nursing Facility   DME Ordered? No   Potential Assistance Purchasing Medications No   Type of Home Care Services None   Patient expects to be discharged to: Skilled nursing facility   Services At/After Discharge   Transition of Care Consult (CM Consult) SNF   [place] Resource Information Provided? No   Mode of Transport at Discharge BLS   Condition of Participation: Discharge Planning   The Plan for Transition of Care is related to the following treatment goals: skilled facility   The Patient and/or Patient Representative was provided with a Choice of Provider? Patient   The Patient and/Or Patient Representative agree with the Discharge Plan? Yes   Freedom of Choice list was provided with basic dialogue that supports the patient's individualized plan of

## 2024-08-12 NOTE — PLAN OF CARE
Problem: Discharge Planning  Goal: Discharge to home or other facility with appropriate resources  Outcome: Progressing  Flowsheets (Taken 8/11/2024 2005)  Discharge to home or other facility with appropriate resources:   Identify barriers to discharge with patient and caregiver   Arrange for needed discharge resources and transportation as appropriate   Identify discharge learning needs (meds, wound care, etc)   Refer to discharge planning if patient needs post-hospital services based on physician order or complex needs related to functional status, cognitive ability or social support system     Problem: Pain  Goal: Verbalizes/displays adequate comfort level or baseline comfort level  Outcome: Progressing     Problem: Safety - Adult  Goal: Free from fall injury  Outcome: Progressing  Flowsheets  Taken 8/11/2024 2307 by Christian Rubio, RN  Free From Fall Injury: Instruct family/caregiver on patient safety  Taken 8/11/2024 1200 by Adelina Ferro, RN  Free From Fall Injury: Instruct family/caregiver on patient safety     Problem: Skin/Tissue Integrity  Goal: Absence of new skin breakdown  Description: 1.  Monitor for areas of redness and/or skin breakdown  2.  Assess vascular access sites hourly  3.  Every 4-6 hours minimum:  Change oxygen saturation probe site  4.  Every 4-6 hours:  If on nasal continuous positive airway pressure, respiratory therapy assess nares and determine need for appliance change or resting period.  Outcome: Progressing     Problem: ABCDS Injury Assessment  Goal: Absence of physical injury  Outcome: Progressing  Flowsheets (Taken 8/11/2024 2307)  Absence of Physical Injury: Implement safety measures based on patient assessment

## 2024-08-13 PROCEDURE — 94640 AIRWAY INHALATION TREATMENT: CPT

## 2024-08-13 PROCEDURE — 6360000002 HC RX W HCPCS: Performed by: ORTHOPAEDIC SURGERY

## 2024-08-13 PROCEDURE — 1200000000 HC SEMI PRIVATE

## 2024-08-13 PROCEDURE — 94660 CPAP INITIATION&MGMT: CPT

## 2024-08-13 PROCEDURE — 6370000000 HC RX 637 (ALT 250 FOR IP): Performed by: FAMILY MEDICINE

## 2024-08-13 PROCEDURE — 94760 N-INVAS EAR/PLS OXIMETRY 1: CPT

## 2024-08-13 PROCEDURE — 2700000000 HC OXYGEN THERAPY PER DAY

## 2024-08-13 PROCEDURE — 2580000003 HC RX 258: Performed by: ORTHOPAEDIC SURGERY

## 2024-08-13 PROCEDURE — 6370000000 HC RX 637 (ALT 250 FOR IP): Performed by: STUDENT IN AN ORGANIZED HEALTH CARE EDUCATION/TRAINING PROGRAM

## 2024-08-13 RX ADMIN — SODIUM CHLORIDE, PRESERVATIVE FREE 10 ML: 5 INJECTION INTRAVENOUS at 19:44

## 2024-08-13 RX ADMIN — METHOCARBAMOL TABLETS 500 MG: 500 TABLET, COATED ORAL at 05:35

## 2024-08-13 RX ADMIN — SODIUM CHLORIDE: 4.5 INJECTION, SOLUTION INTRAVENOUS at 05:26

## 2024-08-13 RX ADMIN — BUDESONIDE 500 MCG: 0.5 SUSPENSION RESPIRATORY (INHALATION) at 08:14

## 2024-08-13 RX ADMIN — BUDESONIDE 500 MCG: 0.5 SUSPENSION RESPIRATORY (INHALATION) at 19:36

## 2024-08-13 RX ADMIN — RIVAROXABAN 10 MG: 10 TABLET, FILM COATED ORAL at 18:11

## 2024-08-13 ASSESSMENT — PAIN - FUNCTIONAL ASSESSMENT
PAIN_FUNCTIONAL_ASSESSMENT: PREVENTS OR INTERFERES SOME ACTIVE ACTIVITIES AND ADLS
PAIN_FUNCTIONAL_ASSESSMENT: PREVENTS OR INTERFERES SOME ACTIVE ACTIVITIES AND ADLS

## 2024-08-13 ASSESSMENT — PAIN DESCRIPTION - DESCRIPTORS
DESCRIPTORS: DULL
DESCRIPTORS: DULL

## 2024-08-13 ASSESSMENT — PAIN DESCRIPTION - LOCATION
LOCATION: LEG;HIP
LOCATION: LEG;HIP

## 2024-08-13 ASSESSMENT — PAIN DESCRIPTION - PAIN TYPE
TYPE: SURGICAL PAIN
TYPE: SURGICAL PAIN

## 2024-08-13 ASSESSMENT — PAIN DESCRIPTION - FREQUENCY
FREQUENCY: INTERMITTENT
FREQUENCY: INTERMITTENT

## 2024-08-13 ASSESSMENT — PAIN DESCRIPTION - ONSET
ONSET: ON-GOING
ONSET: GRADUAL

## 2024-08-13 ASSESSMENT — PAIN DESCRIPTION - ORIENTATION
ORIENTATION: RIGHT
ORIENTATION: RIGHT

## 2024-08-13 ASSESSMENT — PAIN SCALES - GENERAL
PAINLEVEL_OUTOF10: 2
PAINLEVEL_OUTOF10: 2

## 2024-08-13 NOTE — DISCHARGE SUMMARY
Hospital Medicine Discharge Summary    Patient ID: Altagracia Gottlieb      Patient's PCP: No primary care provider on file.    Admit Date: 8/9/2024     Discharge Date:   8/13/2024    Admitting Provider: Cade Gale MD     Discharge Provider: Cade Gale MD     Discharge Diagnoses:       Active Hospital Problems    Diagnosis     Closed fracture of neck of right femur ([facility]) [S72.001A]     Closed right hip fracture, initial encounter ([facility]) [S72.001A]        The patient was seen and examined on day of discharge and this discharge summary is in conjunction with any daily progress note from day of discharge.    Hospital Course:   Altagracia Gottlieb is a 88 y.o. female with pmh of COPD, on 2 L nasal cannula chronically who presents with Closed fracture of neck of right femur .   Patient underwent pinning of the right femur on 8/10/2024 by orthopedic surgery and tolerated well.  Patient was evaluated with physical therapy and Occupational Therapy and seem to be in need of acute rehab.  Patient will be transferred to an acute rehab for further strengthening and management of her femoral neck fracture.  Patient is follow-up with PCP in 3 to 5 days or sooner if needed.  Patient is to follow-up with orthopedic surgery as discussed          Physical Exam Performed:     BP (!) 143/69   Pulse 79   Temp 98.1 °F (36.7 °C) (Oral)   Resp 15   Ht 1.575 m (5' 2\")   Wt 46.1 kg (101 lb 10.1 oz)   SpO2 91%   BMI 18.59 kg/m²   General: NAD, while on 2 L nasal cannula  Eyes: EOMI  ENT: neck supple  Cardiovascular: Regular rate.  Respiratory: Moderate rhonchi throughout while on 2 L nasal cannula  Gastrointestinal: Soft, non tender  Genitourinary: no suprapubic tenderness  Musculoskeletal: No edema, tender anterior right hip  Skin: warm, dry  Neuro: Alert.  Psych: Mood appropriate.       Labs: For convenience and continuity at follow-up the following most recent labs are provided:      CBC:    Lab Results   Component Value  Referral placed and patient notified.

## 2024-08-13 NOTE — CARE COORDINATION
Spoke w/ daughter Ernestina- she chooses [facility]  Spoke w/ Sierra at [facility] #[phone] - they will initiate precert.  Electronically signed by Elida Curran on 8/13/2024 at 8:59 AM  #[phone]

## 2024-08-13 NOTE — PLAN OF CARE
Problem: Discharge Planning  Goal: Discharge to home or other facility with appropriate resources  8/12/2024 2318 by Iona Denise, RN  Outcome: Progressing  Flowsheets (Taken 8/12/2024 2318)  Discharge to home or other facility with appropriate resources: Identify discharge learning needs (meds, wound care, etc)     Problem: Pain  Goal: Verbalizes/displays adequate comfort level or baseline comfort level  8/12/2024 2318 by Iona Denise, RN  Outcome: Progressing  Flowsheets (Taken 8/12/2024 2318)  Verbalizes/displays adequate comfort level or baseline comfort level:   Encourage patient to monitor pain and request assistance   Administer analgesics based on type and severity of pain and evaluate response   Assess pain using appropriate pain scale   Implement non-pharmacological measures as appropriate and evaluate response     Problem: Safety - Adult  Goal: Free from fall injury  8/12/2024 2318 by Iona Denise, RN  Outcome: Progressing  Flowsheets (Taken 8/12/2024 2318)  Free From Fall Injury: Instruct family/caregiver on patient safety     Problem: Skin/Tissue Integrity  Goal: Absence of new skin breakdown  Description: 1.  Monitor for areas of redness and/or skin breakdown  2.  Assess vascular access sites hourly  3.  Every 4-6 hours minimum:  Change oxygen saturation probe site  4.  Every 4-6 hours:  If on nasal continuous positive airway pressure, respiratory therapy assess nares and determine need for appliance change or resting period.  8/12/2024 2318 by Iona Denise, RN  Outcome: Progressing  Note: Skin assessment complete. No new signs of skin breakdown noted. Assistance provided with repositioning while in bed.     Problem: ABCDS Injury Assessment  Goal: Absence of physical injury  8/12/2024 2318 by Iona Denise, RN  Outcome: Progressing  Flowsheets (Taken 8/12/2024 2318)  Absence of Physical Injury: Implement safety measures based on patient assessment

## 2024-08-13 NOTE — PLAN OF CARE
Problem: Discharge Planning  Goal: Discharge to home or other facility with appropriate resources  8/13/2024 1027 by Krystle Kessler, RN  Outcome: Progressing  Flowsheets (Taken 8/13/2024 1027)  Discharge to home or other facility with appropriate resources:   Identify barriers to discharge with patient and caregiver   Identify discharge learning needs (meds, wound care, etc)   Arrange for needed discharge resources and transportation as appropriate  8/12/2024 2318 by Iona Denise, RN  Outcome: Progressing  Flowsheets (Taken 8/12/2024 2318)  Discharge to home or other facility with appropriate resources: Identify discharge learning needs (meds, wound care, etc)     Problem: Pain  Goal: Verbalizes/displays adequate comfort level or baseline comfort level  8/13/2024 1027 by Krystle Kessler, RN  Outcome: Progressing  Flowsheets (Taken 8/13/2024 1027)  Verbalizes/displays adequate comfort level or baseline comfort level:   Encourage patient to monitor pain and request assistance   Administer analgesics based on type and severity of pain and evaluate response   Assess pain using appropriate pain scale  8/12/2024 2318 by Iona Denise, RN  Outcome: Progressing  Flowsheets (Taken 8/12/2024 2318)  Verbalizes/displays adequate comfort level or baseline comfort level:   Encourage patient to monitor pain and request assistance   Administer analgesics based on type and severity of pain and evaluate response   Assess pain using appropriate pain scale   Implement non-pharmacological measures as appropriate and evaluate response     Problem: Safety - Adult  Goal: Free from fall injury  8/13/2024 1027 by Krystle Kessler, RN  Outcome: Progressing  Flowsheets (Taken 8/13/2024 1027)  Free From Fall Injury:   Instruct family/caregiver on patient safety   Based on caregiver fall risk screen, instruct family/caregiver to ask for assistance with transferring infant if caregiver noted to have fall risk factors  8/12/2024

## 2024-08-13 NOTE — CARE COORDINATION
[place] w/ [facility]/ [facility] #[phone] - precert remains pending     Notified daughter Ernestina of the above.  Will follow.  Electronically signed by Elida Curran on 8/13/2024 at 3:57 PM  #[phone]

## 2024-08-14 VITALS
WEIGHT: 101.63 LBS | SYSTOLIC BLOOD PRESSURE: 126 MMHG | OXYGEN SATURATION: 95 % | RESPIRATION RATE: 19 BRPM | DIASTOLIC BLOOD PRESSURE: 58 MMHG | BODY MASS INDEX: 18.7 KG/M2 | HEART RATE: 80 BPM | HEIGHT: 62 IN | TEMPERATURE: 97.8 F

## 2024-08-14 LAB
ALBUMIN SERPL-MCNC: 2.8 G/DL (ref 3.4–5)
ALBUMIN/GLOB SERPL: 1.2 {RATIO} (ref 1.1–2.2)
ALP SERPL-CCNC: 66 U/L (ref 40–129)
ALT SERPL-CCNC: 6 U/L (ref 10–40)
ANION GAP SERPL CALCULATED.3IONS-SCNC: 9 MMOL/L (ref 3–16)
AST SERPL-CCNC: 19 U/L (ref 15–37)
BILIRUB SERPL-MCNC: 0.6 MG/DL (ref 0–1)
BUN SERPL-MCNC: 16 MG/DL (ref 7–20)
CALCIUM SERPL-MCNC: 7.9 MG/DL (ref 8.3–10.6)
CHLORIDE SERPL-SCNC: 109 MMOL/L (ref 99–110)
CO2 SERPL-SCNC: 23 MMOL/L (ref 21–32)
CREAT SERPL-MCNC: 0.6 MG/DL (ref 0.6–1.2)
DEPRECATED RDW RBC AUTO: 16.6 % (ref 12.4–15.4)
GFR SERPLBLD CREATININE-BSD FMLA CKD-EPI: 86 ML/MIN/{1.73_M2}
GLUCOSE SERPL-MCNC: 109 MG/DL (ref 70–99)
HCT VFR BLD AUTO: 32.1 % (ref 36–48)
HGB BLD-MCNC: 10.6 G/DL (ref 12–16)
MCH RBC QN AUTO: 29.3 PG (ref 26–34)
MCHC RBC AUTO-ENTMCNC: 33.1 G/DL (ref 31–36)
MCV RBC AUTO: 88.8 FL (ref 80–100)
PLATELET # BLD AUTO: 225 K/UL (ref 135–450)
PMV BLD AUTO: 8.2 FL (ref 5–10.5)
POTASSIUM SERPL-SCNC: 3.4 MMOL/L (ref 3.5–5.1)
PROT SERPL-MCNC: 5.1 G/DL (ref 6.4–8.2)
RBC # BLD AUTO: 3.61 M/UL (ref 4–5.2)
SODIUM SERPL-SCNC: 141 MMOL/L (ref 136–145)
WBC # BLD AUTO: 8 K/UL (ref 4–11)

## 2024-08-14 PROCEDURE — 85027 COMPLETE CBC AUTOMATED: CPT

## 2024-08-14 PROCEDURE — 6370000000 HC RX 637 (ALT 250 FOR IP): Performed by: ORTHOPAEDIC SURGERY

## 2024-08-14 PROCEDURE — 80053 COMPREHEN METABOLIC PANEL: CPT

## 2024-08-14 PROCEDURE — 2700000000 HC OXYGEN THERAPY PER DAY

## 2024-08-14 PROCEDURE — 94761 N-INVAS EAR/PLS OXIMETRY MLT: CPT

## 2024-08-14 PROCEDURE — 6360000002 HC RX W HCPCS: Performed by: ORTHOPAEDIC SURGERY

## 2024-08-14 PROCEDURE — 6370000000 HC RX 637 (ALT 250 FOR IP): Performed by: STUDENT IN AN ORGANIZED HEALTH CARE EDUCATION/TRAINING PROGRAM

## 2024-08-14 PROCEDURE — 36415 COLL VENOUS BLD VENIPUNCTURE: CPT

## 2024-08-14 PROCEDURE — 94640 AIRWAY INHALATION TREATMENT: CPT

## 2024-08-14 PROCEDURE — 6360000002 HC RX W HCPCS: Performed by: FAMILY MEDICINE

## 2024-08-14 RX ADMIN — BUDESONIDE 500 MCG: 0.5 SUSPENSION RESPIRATORY (INHALATION) at 08:37

## 2024-08-14 RX ADMIN — METHOCARBAMOL TABLETS 500 MG: 500 TABLET, COATED ORAL at 02:23

## 2024-08-14 RX ADMIN — ACETAMINOPHEN 650 MG: 325 TABLET ORAL at 08:47

## 2024-08-14 RX ADMIN — METHOCARBAMOL TABLETS 500 MG: 500 TABLET, COATED ORAL at 08:47

## 2024-08-14 RX ADMIN — ALBUTEROL SULFATE 2.5 MG: 2.5 SOLUTION RESPIRATORY (INHALATION) at 08:38

## 2024-08-14 RX ADMIN — ACETAMINOPHEN 650 MG: 325 TABLET ORAL at 02:24

## 2024-08-14 ASSESSMENT — PAIN DESCRIPTION - DESCRIPTORS
DESCRIPTORS: ACHING
DESCRIPTORS: ACHING;DISCOMFORT
DESCRIPTORS: ACHING

## 2024-08-14 ASSESSMENT — PAIN DESCRIPTION - ORIENTATION
ORIENTATION: RIGHT

## 2024-08-14 ASSESSMENT — PAIN SCALES - GENERAL
PAINLEVEL_OUTOF10: 4
PAINLEVEL_OUTOF10: 3
PAINLEVEL_OUTOF10: 2
PAINLEVEL_OUTOF10: 1

## 2024-08-14 ASSESSMENT — PAIN DESCRIPTION - PAIN TYPE
TYPE: SURGICAL PAIN
TYPE: SURGICAL PAIN

## 2024-08-14 ASSESSMENT — PAIN DESCRIPTION - LOCATION
LOCATION: HIP;LEG
LOCATION: HIP
LOCATION: HIP;LEG

## 2024-08-14 NOTE — PLAN OF CARE
Problem: Discharge Planning  Goal: Discharge to home or other facility with appropriate resources  8/14/2024 0002 by Iona Denise, RN  Outcome: Progressing  Flowsheets (Taken 8/14/2024 0002)  Discharge to home or other facility with appropriate resources:   Identify barriers to discharge with patient and caregiver   Identify discharge learning needs (meds, wound care, etc)     Problem: Pain  Goal: Verbalizes/displays adequate comfort level or baseline comfort level  8/14/2024 0002 by Iona Denise, RN  Outcome: Progressing  Flowsheets (Taken 8/14/2024 0002)  Verbalizes/displays adequate comfort level or baseline comfort level:   Encourage patient to monitor pain and request assistance   Assess pain using appropriate pain scale   Administer analgesics based on type and severity of pain and evaluate response   Implement non-pharmacological measures as appropriate and evaluate response     Problem: Safety - Adult  Goal: Free from fall injury  8/14/2024 0002 by Iona Denise RN  Outcome: Progressing  Flowsheets (Taken 8/14/2024 0002)  Free From Fall Injury: Instruct family/caregiver on patient safety     Problem: Skin/Tissue Integrity  Goal: Absence of new skin breakdown  Description: 1.  Monitor for areas of redness and/or skin breakdown  2.  Assess vascular access sites hourly  3.  Every 4-6 hours minimum:  Change oxygen saturation probe site  4.  Every 4-6 hours:  If on nasal continuous positive airway pressure, respiratory therapy assess nares and determine need for appliance change or resting period.  Outcome: Progressing  Note: Skin assessment complete. No new signs of skin breakdown noted. Assistance provided with repositioning while in bed.     Problem: ABCDS Injury Assessment  Goal: Absence of physical injury  8/14/2024 0002 by Iona Denise, RN  Outcome: Progressing  Flowsheets (Taken 8/14/2024 0002)  Absence of Physical Injury: Implement safety measures based on patient assessment

## 2024-08-14 NOTE — CARE COORDINATION
DISCHARGE SUMMARY     DATE OF DISCHARGE: 8/14/24    DISCHARGE DESTINATION: [facility]    Level of Care: Skilled  Discharging to Facility/ Agency   Name: [facility]  Address:  [address] [place] [address]  Phone:  [phone]  Fax:  [phone]  Precert Obtained: yes    Hens Completed: yes    PASARR: N/A    Notified: RN, Family, and Facility/Agency  Notified yvette Do  TRANSPORTATION: [employer]    Company Name: [employer]     Time: 11:00AM    Phone Number: [phone]  Electronically signed by Elida Curran on 8/14/2024 at 9:15 AM  #[phone]

## 2024-08-14 NOTE — PROGRESS NOTES
V2.0    [facility] [place]      Name:  Altagracia Gottlieb /Age/Sex: 1935  (88 y.o. female)   MRN & CSN:  [id number] & [id number] Encounter Date/Time: 2024 11:55 AM EDT   Location:  [id number]-1141/3121-01 PCP: No primary care provider on file.     Attending:Cade Gale MD       Hospital Day: 4    Assessment and Recommendations   Altagracia Gottlieb is a 88 y.o. female with pmh of COPD who presents with Closed fracture of neck of right femur (HCC)    2024  Patient was examined while in bed.  Patient is feeling well status post op day 3, right hip pinning.  Pain is controlled with medications.    Recommendations will be made for acute rehab, options will be discussed with her daughter Omayra  Patient will be started on Xarelto 10 mg daily for 30 days.  Patient has no complaint at time of interview        Plan:   Closed fracture of the neck of the right femur due to mechanical fall at home  Orthopedic surgeon was consulted and patient was taken to the OR on 8/10/2024.  Patient underwent right hip pinning and tolerated it well.  Pain control.  PT OT on board.      2.  COPD exacerbation  Currently is on 2 L nasal cannula, usually patient is not on any oxygen at home.  Albuterol, ipratropium, budesonide breathing treatments      Diet ADULT DIET; Regular   DVT Prophylaxis [] Lovenox, []  Heparin, [x] SCDs, [] Ambulation,  [] Eliquis, [] Xarelto  [] Coumadin   Code Status DNR-CC   Disposition From: Home  Expected Disposition: Home versus SNF  Estimated Date of Discharge: 1-2 days  Patient requires continued admission due to upon clinical improvement   Surrogate Decision Maker/ POA       Personally reviewed Lab Studies and Imaging       Medical Decision Making:  The following items were considered in medical decision making:  Discussion of patient care with other providers  Reviewed clinical lab tests  Reviewed radiology tests  Reviewed other diagnostic tests/interventions  Independent review of radiologic 
    V2.0    [facility] [place]      Name:  Altagracia Gottlieb /Age/Sex: 1935  (88 y.o. female)   MRN & CSN:  [id number] & [id number] Encounter Date/Time: 8/10/2024 11:55 AM EDT   Location:  [id number]-9308/3121-01 PCP: No primary care provider on file.     Attending:Cade Gale MD       Hospital Day: 2    Assessment and Recommendations   Altagracia Gottlieb is a 88 y.o. female with pmh of COPD who presents with Closed fracture of neck of right femur (HCC)    8/10/2024  Patient was examined while in bed.  Patient is feeling well status post op day 1, right hip pinning.  Pain is controlled with medications.  Patient has no complaint at time of interview      Plan:   Closed fracture of the neck of the right femur due to mechanical fall at home  Orthopedic surgeon was consulted and patient was taken to the OR on 8/10/2024.  Patient underwent right hip pinning and tolerated it well.  Pain control.  PT OT on board.      2.  COPD exacerbation  Currently is on 2 L nasal cannula, usually patient is not on any oxygen at home.  Albuterol, ipratropium, budesonide breathing treatments      Diet ADULT DIET; Regular   DVT Prophylaxis [] Lovenox, []  Heparin, [x] SCDs, [] Ambulation,  [] Eliquis, [] Xarelto  [] Coumadin   Code Status DNR-CC   Disposition From: Home  Expected Disposition: Home versus SNF  Estimated Date of Discharge: 1-2 days  Patient requires continued admission due to upon clinical improvement   Surrogate Decision Maker/ POA       Personally reviewed Lab Studies and Imaging       Medical Decision Making:  The following items were considered in medical decision making:  Discussion of patient care with other providers  Reviewed clinical lab tests  Reviewed radiology tests  Reviewed other diagnostic tests/interventions  Independent review of radiologic images  Microbiology cultures and other micro tests reviewed      Subjective:     Chief Complaint: Mechanical fall at home which ended her up with a closed fracture of 
  4 Eyes Admission Assessment     I agree as the admission nurse that 2 RN's have performed a thorough Head to Toe Skin Assessment on the patient. ALL assessment sites listed below have been assessed on admission.       Areas assessed by both nurses:   [x]   Head, Face, and Ears   [x]   Shoulders, Back, and Chest  [x]   Arms, Elbows, and Hands   [x]   Coccyx, Sacrum, and Ischum  [x]   Legs, Feet, and Heels        Does the Patient have Skin Breakdown?  No         Marlo Prevention initiated:  Yes   Wound Care Orders initiated:  No      [facility] nurse consulted for Pressure Injury (Stage 3,4, Unstageable, DTI, NWPT, and Complex wounds):  No      Nurse 1 eSignature: Electronically signed by Anitra Cross RN on 8/9/24 at 5:36 PM EDT    **SHARE this note so that the co-signing nurse is able to place an eSignature**    Nurse 2 eSignature: Electronically signed by Dinorah Acosta RN on 8/9/24 at 6:10 PM EDT             
Assisted patient to bathroom with use of stedy. Patient had a small amount of incontinence in depends and urinated in commode. No hat for measurement. Patient assisted to chair. Patient tolerated activity fairly well and reports mild pain in right hip. Patient declined any PRN pain medication at this time. Informed patient to call if pain increases or she changes her mind, patient verbalized understanding.   
[facility] Orthopedic Surgery   Progress Note      S/P :  SUBJECTIVE  in bed. Alert and oriented. Pain is   described in right  hip and with the intensity of mild. Pain is described as aching.       OBJECTIVE              Physical                      VITALS:  BP (!) 151/78   Pulse 80   Temp 97.3 °F (36.3 °C) (Oral)   Resp 15   Ht 1.575 m (5' 2\")   Wt 46.1 kg (101 lb 10.1 oz)   SpO2 91%   BMI 18.59 kg/m²                     MUSCULOSKELETAL:  right foot NVI. Wiggles toes to command. Able to plantarflex and dorsiflex ankle Pedal pulses are palpable.                    NEUROLOGIC:                                  Sensory:  Touch:  Right Lower Extremity:  normal                                                 Surgical wound appears clean and dry right hip with Mepilex square dressing Ice pack on.     Data       CBC:   Lab Results   Component Value Date/Time    WBC 11.3 08/09/2024 08:17 AM    RBC 4.44 08/09/2024 08:17 AM    HGB 11.8 08/10/2024 04:21 AM    HCT 35.9 08/10/2024 04:21 AM    MCV 89.5 08/09/2024 08:17 AM    MCH 29.1 08/09/2024 08:17 AM    MCHC 32.5 08/09/2024 08:17 AM    RDW 17.5 08/09/2024 08:17 AM     08/09/2024 08:17 AM    MPV 8.0 08/09/2024 08:17 AM        WBC:    Lab Results   Component Value Date/Time    WBC 11.3 08/09/2024 08:17 AM        Hemoglobin/Hematocrit:    Lab Results   Component Value Date/Time    HGB 11.8 08/10/2024 04:21 AM    HCT 35.9 08/10/2024 04:21 AM        PT/INR:  No results found for: \"PROTIME\", \"INR\"           Current Inpatient Medications             Current Facility-Administered Medications: albuterol (PROVENTIL) (2.5 MG/3ML) 0.083% nebulizer solution 2.5 mg, 2.5 mg, Nebulization, Q4H PRN  rivaroxaban (XARELTO) tablet 10 mg, 10 mg, Oral, Daily  albuterol sulfate HFA (PROVENTIL;VENTOLIN;PROAIR) 108 (90 Base) MCG/ACT inhaler 2 puff, 2 puff, Inhalation, Q4H PRN  methocarbamol (ROBAXIN) tablet 500 mg, 500 mg, Oral, TID PRN  sodium chloride flush 0.9 % injection 5-40 mL, 5-40 
[facility] Orthopedic Surgery   Progress Note      S/P :  SUBJECTIVE  up in chair. Alert and oriented. . Pain is   described in right hip and with the intensity of none.     OBJECTIVE              Physical                      VITALS:  BP (!) 168/85   Pulse 85   Temp 98.7 °F (37.1 °C) (Oral)   Resp 16   Ht 1.575 m (5' 2\")   Wt 46.1 kg (101 lb 10.1 oz)   SpO2 95%   BMI 18.59 kg/m²                     MUSCULOSKELETAL:  right foot NVI. Wiggles toes to command. Able to plantarflex and dorsiflex ankle Pedal pulses are palpable.                    NEUROLOGIC:                                  Sensory:  Touch:  Right Lower Extremity:  normal                                                 Surgical wound appears clean and dry right hip with Mepilex square dressing. Ice pack on.     Data       CBC:   Lab Results   Component Value Date/Time    WBC 11.3 08/09/2024 08:17 AM    RBC 4.44 08/09/2024 08:17 AM    HGB 11.8 08/10/2024 04:21 AM    HCT 35.9 08/10/2024 04:21 AM    MCV 89.5 08/09/2024 08:17 AM    MCH 29.1 08/09/2024 08:17 AM    MCHC 32.5 08/09/2024 08:17 AM    RDW 17.5 08/09/2024 08:17 AM     08/09/2024 08:17 AM    MPV 8.0 08/09/2024 08:17 AM        WBC:    Lab Results   Component Value Date/Time    WBC 11.3 08/09/2024 08:17 AM        Hemoglobin/Hematocrit:    Lab Results   Component Value Date/Time    HGB 11.8 08/10/2024 04:21 AM    HCT 35.9 08/10/2024 04:21 AM        PT/INR:  No results found for: \"PROTIME\", \"INR\"           Current Inpatient Medications             Current Facility-Administered Medications: albuterol (PROVENTIL) (2.5 MG/3ML) 0.083% nebulizer solution 2.5 mg, 2.5 mg, Nebulization, Q4H PRN  rivaroxaban (XARELTO) tablet 10 mg, 10 mg, Oral, Daily  albuterol sulfate HFA (PROVENTIL;VENTOLIN;PROAIR) 108 (90 Base) MCG/ACT inhaler 2 puff, 2 puff, Inhalation, Q4H PRN  methocarbamol (ROBAXIN) tablet 500 mg, 500 mg, Oral, TID PRN  sodium chloride flush 0.9 % injection 5-40 mL, 5-40 mL, IntraVENous, 2 times 
[facility] Orthopedic Surgery  Progress Note        POD # 1, s/p right hip pinning.    Pt comfortable, no c/o.  Drsg right hip D/C/I,  Mild pain with right hip ROM, NVI    CBC:   Lab Results   Component Value Date/Time    WBC 11.3 08/09/2024 08:17 AM    RBC 4.44 08/09/2024 08:17 AM    HGB 11.8 08/10/2024 04:21 AM    HCT 35.9 08/10/2024 04:21 AM    MCV 89.5 08/09/2024 08:17 AM    MCH 29.1 08/09/2024 08:17 AM    MCHC 32.5 08/09/2024 08:17 AM    RDW 17.5 08/09/2024 08:17 AM     08/09/2024 08:17 AM    MPV 8.0 08/09/2024 08:17 AM     PT/INR:  No results found for: \"PROTIME\", \"INR\"  PTT:  No results found for: \"APTT\"[APTT    A/P: s/p right hip pinning.  - Stable  - PT/OT, 25% WB right leg  - Ok to D/C to ECF from ortho standpoint.  - F/U Dr Olson in 2 weeks.      Robby Olson MD 8/10/2024 5:10 PM    
Patient admitted to room 3121. Patient oriented x 4. Respirations regular and unlabored on O2 @ 4 L. Respiratory called to evaluate patient due to oxygen saturation of  89%. O2  increased to 5 L per nasal cannula HOB elevated . .Patient repositioned for for comfort . Right hip/mepilex drsg dry and intact.  Fall/safety precautions in place. Call light in reach.  Electronically signed by Anitra Cross RN on 8/9/2024 at 8:50 PM    
Patient c/o SOB. O2 saturation 88%. Educated patient on slow breaths, in through the nose and out through the mouth as supplemental O2 is administered via nasal cannula. Lungs sounds clear in upper lobes and diminished in lower lobes.  No changes to patient respiratory status noted from previous assessment.  Patient reported feeling \"stuffy\" in her nose. Humidification applied to supplemental O2. Patient breathing regulated and O2 sats recovered to 93%. Patient aware to contact nursing staff if she continues to have difficulty breathing. Patient requested a nebulizer treatment. Primary RN to notify respiratory. No other needs noted at this time. Standard safety precautions in place and call light within reach. Electronically signed by Nayana Monsivais RN on 8/10/2024 at 4:54 PM    
Patient is alert & oriented x4, stedy x2, 2/4 bed rails up, bed in lowest position, fall precautions in place, call light within reach. Pt denies need for pain medication at this time. IV fluid infusing per order. Pt remains on 4L of O2 nasal cannula.    Electronically signed by Obdulio Young RN on 8/12/2024 at 11:03 AM    
Patient is alert and oriented X4 most of shift. Patient has attempted to get out of bed twice within past hour and is having mild forgetfulness such as forgetting her daughter visited and she is in the hospital. Easily reoriented. Not tolerating SCD boots at this time. Patient has been resting in bed throughout night, bed in lowest position. Fall precautions in place. Patient ambulated to bathroom once throughout night using a stedy x2. Patient given nightly medications as ordered. Patient tolerating PO fluids at this time and having adequate intake and output. All patient needs met throughout night. Plan of care ongoing.      Electronically signed by Angelic Edmonds RN on 8/11/2024 at 4:31 AM   
Patient is getting breathing treatment.   
Patient resting and sleeping at intervals O2 sat 92% on O2 at 5L. HOB elevated . Daughter at bedside. Right hip drsg/dry and intact. Fall/safety precautions in place. Call light in reach. Electronically signed by Anitra Cross RN on 8/9/2024 at 8:55 PM     
Patient resting in bed watching TV this morning. Patient denies having any pain. Fall precautions remain in place, bed locked in lowest position, call light within reach and alarm on. Patient denies further needs at this time.    
Patient return to bed from chair with stedy. Pt tolerated this activity well. Assessment done and charted. Call light and belongings in reach, bed alarm activated. Patient denies need for pain med at this time. Rt hip dressing CD&I. Will continue to monitor.  
Patient return to bed from chair with use of stedy. Pt tolerated this activity well. Call light and belongings in reach. Will continue to monitor.  
Pharmacy Medication Reconciliation Note     List of medications patient is currently taking is complete.    Source of information:   1. Conversation with patient   2. EMR    Notes regarding home medications:   Patient denies taking any regular Rx or OTC home medications. Does have an albuterol inhaler that she uses PRN - has been taking daily for the past week due to SOB.      Merle Ayers PharmD  8/9/2024 11:14 AM    
Physical Therapy  Facility/Department: [address] ORTHOPEDICS  Daily Treatment Note    This note serves as patient discharge summary if pt discharges prior to next PT visit      Name: Altagracia Gottlieb  : 1935  MRN: [id number]  Date of Service: 2024    Discharge Recommendations:  Therapy recommended at discharge (3-5)   Altagracia Gottlieb scored a  on the AM-PAC short mobility form. Current research shows that an AM-PAC score of 17 or less is typically not associated with a discharge to the patient's home setting. Based on the patient's AM-PAC score and their current functional mobility deficits, it is recommended that the patient have 3-5 sessions per week of Physical Therapy at d/c to increase the patient's independence.  Please see assessment section for further patient specific details.    PT Equipment Recommendations  Other: monitor      Patient Diagnosis(es): The primary encounter diagnosis was Fall, initial encounter. Diagnoses of Closed fracture of neck of right femur, initial encounter (HCC), Closed compression fracture of L4 lumbar vertebra, initial encounter (HCC), Acute respiratory failure with hypoxia and hypercapnia (HCC), Infrarenal abdominal aortic aneurysm (AAA) without rupture (HCC), Parotid nodule, COPD with acute exacerbation (HCC), and Tobacco use were also pertinent to this visit.  Past Medical History:  has no past medical history on file.  Past Surgical History:  has a past surgical history that includes hip surgery (Right, 2024).    Assessment   Body Structures, Functions, Activity Limitations Requiring Skilled Therapeutic Intervention: Decreased functional mobility ;Decreased ADL status;Decreased endurance;Increased pain;Decreased posture  Assessment: 88 y.o. female with a pmh of COPD to hospital due to complaints of R hip pain. CT Pelvis: Superior endplate compression L4 with moderate height loss and 5 mm retropulsion, technically age indeterminate. Right hip minimally 
Pt awake, states pain 4/10 and tolerable.  Pedal pulses audible with doppler bilaterally.  Report to Eun.  To room per bed.   
RN heard patient coughing from hallway. RN entered room to make sure patient was ok, patient asked RN to call RT for albuterol nebulizer. RN called @ 1704, RT on their way to patient room.  
Respiratory contacted for pre-op breathing treatment.   
[facility] Orthopedic Surgery  Progress Note        POD # 2, s/p right hip pinning.    Pt comfortable, no c/o.  Drsg right hip D/C/I,  Mild pain with right hip ROM, NVI    CBC:   Lab Results   Component Value Date/Time    WBC 11.3 08/09/2024 08:17 AM    RBC 4.44 08/09/2024 08:17 AM    HGB 11.8 08/10/2024 04:21 AM    HCT 35.9 08/10/2024 04:21 AM    MCV 89.5 08/09/2024 08:17 AM    MCH 29.1 08/09/2024 08:17 AM    MCHC 32.5 08/09/2024 08:17 AM    RDW 17.5 08/09/2024 08:17 AM     08/09/2024 08:17 AM    MPV 8.0 08/09/2024 08:17 AM     PT/INR:  No results found for: \"PROTIME\", \"INR\"  PTT:  No results found for: \"APTT\"[APTT    A/P: s/p right hip pinning.  - Stable  - PT/OT, 25% WB right leg  - Ok to D/C to ECF from ortho standpoint.  - F/U Dr Olson in 2 weeks.      Robby Olson MD 8/11/2024 12:19 PM    
The patients OARRS report has been reviewed online and any prescribing of pain related medications is based on our findings.The patient has been issued narcotics to safely reduce postoperative pain and promote tolerance with physical therapies and ADL's. Reduction in dosing will be addressed with the next narcotic refill request. Dosing is adjusted for patients with history of chronic pain disorders.    
To pacu from OR. Pt asleep. Dressing to right hip dry and intact.  Ice to right hip.  IV infusing.  Monitor in sinus arrhythmia.   
chair with cues for hand placement. Stand step transfer from BSC to recliner chair with use of RW with Min/Mod A, cues for WB and assist for walker management. Stood with Min/CGA with RW during ADL tasks. Dependent for lower body dressing and toileting tasks. Completed grooming tasks with set up. Patient is unable to return home at this time due to assist level and limited WB. Patient would benefit from low to mod therapy to maximize IND.  REQUIRES OT FOLLOW-UP: Yes  Activity Tolerance  Activity Tolerance: Patient Tolerated treatment well  Activity Tolerance Comments: o2 sats at end of session on 2L 94%        Plan   Occupational Therapy Plan  Times Per Week: 3-5x  Current Treatment Recommendations: Strengthening, Balance training, Functional mobility training, Endurance training, Safety education & training, Self-Care / ADL, Patient/Caregiver education & training     Restrictions  Restrictions/Precautions  Restrictions/Precautions: Fall Risk, Weight Bearing  Lower Extremity Weight Bearing Restrictions  Right Lower Extremity Weight Bearing: Partial Weight Bearing (25 % (~ 25pounds))  Position Activity Restriction  Other position/activity restrictions: [place].  8-12-24: 4 L O2    Subjective   General  Chart Reviewed: Yes  Patient assessed for rehabilitation services?: Yes  Additional Pertinent Hx: per ED note, Altagracia Gottlieb is a 88 y.o. female who presents to the emergency department today for evaluation of right hip pain after sustaining a fall this morning.  Patient lives at home with her daughter.  Patient states that she is supposed to use a cane or walker but admits that she is not always compliant with this.  Patient states that she is unsure of what exactly caused today's fall.  She does not recall feeling lightheaded or dizzy prior to this fall.  She reports falling from standing onto carpeted devi.  She states that she does not believe that she hit her head.  She denies loss of consciousness.  She is not 
suprapubic tenderness  Musculoskeletal: No edema, tender anterior right hip  Skin: warm, dry  Neuro: Alert.  Psych: Mood appropriate.       Medications:   Medications:    rivaroxaban  10 mg Oral Daily    sodium chloride flush  5-40 mL IntraVENous 2 times per day    budesonide  0.5 mg Nebulization BID RT      Infusions:    sodium chloride       PRN Meds: albuterol, 2.5 mg, Q4H PRN  albuterol sulfate HFA, 2 puff, Q4H PRN  methocarbamol, 500 mg, TID PRN  sodium chloride flush, 5-40 mL, PRN  sodium chloride, , PRN  potassium chloride, 40 mEq, PRN   Or  potassium alternative oral replacement, 40 mEq, PRN   Or  potassium chloride, 10 mEq, PRN  magnesium sulfate, 2,000 mg, PRN  ondansetron, 4 mg, Q8H PRN   Or  ondansetron, 4 mg, Q6H PRN  polyethylene glycol, 17 g, Daily PRN  acetaminophen, 650 mg, Q6H PRN   Or  acetaminophen, 650 mg, Q6H PRN  ipratropium, 0.5 mg, Q4H PRN        Labs and Imaging   XR FEMUR RIGHT (MIN 2 VIEWS)    Result Date: 8/9/2024  EXAMINATION: 2 XRAY VIEWS OF THE RIGHT FEMUR 8/9/2024 7:46 am COMPARISON: CT of the pelvis performed earlier the same day. HISTORY: ORDERING SYSTEM PROVIDED HISTORY: FALL/HIP PAIN TECHNOLOGIST PROVIDED HISTORY: Reason for exam:->FALL/HIP PAIN Reason for Exam: FALL/HIP PAIN FINDINGS: There is diffuse generalized osteopenia.  There is subtle mildly impacted subcapital fracture of the right proximal femur.  No other acute fracture or dislocation is identified.  Enthesophyte identified along anterior superior margin of the patella.  Arterial vascular stent identified in the left pelvis.     Subcapital fracture right proximal femur as described above.     XR HIP RIGHT (2-3 VIEWS)    Result Date: 8/9/2024  Radiology exam is complete. No Radiologist dictation. Please follow up with ordering provider.     FLUORO FOR SURGICAL PROCEDURES    Result Date: 8/9/2024  Radiology exam is complete. No Radiologist dictation. Please follow up with ordering provider.     CT PELVIS WO CONTRAST 
times  Hearing  Hearing: Exceptions to WFL  Hearing Exceptions: Hard of hearing/hearing concerns;Bilateral hearing aid (wears HAs \"sometimes\")      Cognition   Orientation  Overall Orientation Status: Within Functional Limits  Cognition  Overall Cognitive Status: WFL     Objective     AROM RLE (degrees)  RLE AROM: WFL  RLE General AROM: guarded. Hip/knee flexion 90/90 at EOB sitting. Ankle DF 10 degrees.  AROM LLE (degrees)  LLE AROM : WNL  Strength RLE  Comment: able to maintain NWBng>25% WBng during 5' [place] walker gait.  Strength LLE  Strength LLE: WNL        Bed Mobility Training  Bed Mobility Training: Yes  Overall Level of Assistance: Minimum assistance  Supine to Sit: Minimum assistance  Scooting: Minimum assistance  Balance  Sitting: Intact  Standing: Impaired (RW)  Standing - Static: Good;Fair  Standing - Dynamic: Fair  Transfer Training  Transfer Training: Yes (RW; cues for hand placement)  Overall Level of Assistance: Minimum assistance (RW)  Sit to Stand: Minimum assistance (RW)  Stand to Sit: Minimum assistance  Bed to Chair: Minimum assistance (RW)  Gait  Gait Training: Yes (RW; small steps bed to chair; good ability to adhere to WB status)  Overall Level of Assistance: Minimum assistance  Distance (ft): 2 Feet  Assistive Device: Walker, rolling  Bed mobility  Supine to Sit: Minimal assistance (Increased time. Guarded R LE. Exits to her L.)  Transfers  Sit to Stand: Minimal Assistance (cues.)  Stand to Sit: Minimal Assistance (cues)  Ambulation  Device: Rolling Walker (jnr height)  Assistance: Minimal assistance  Quality of Gait: cues and assist for 25% R LE WBng. Discontinuous, effortful.     Balance  Posture: Fair (flexed trunk)  Comments: SBA-CGA EOB sitting for safety.  CGA-Min A brief dynamic at RW  Exercise Treatment: ankle pumps x 5 B.  Patient stands at Walker with CGA x 1 minute as OT performs alisha care and dons brief to patient. EOB sitting with SBA as Ot assists patient in gown change.    
Cognitive Status: WFL  Orientation  Overall Orientation Status: Within Functional Limits                  Education Given To: Patient  Education Provided: Role of Therapy;Plan of Care;Transfer Training;ADL Adaptive Strategies  Education Method: Demonstration;Verbal  Barriers to Learning: Hearing  Education Outcome: Verbalized understanding;Demonstrated understanding;Continued education needed          AM-PAC - ADL  AM-PAC Daily Activity - Inpatient   How much help is needed for putting on and taking off regular lower body clothing?: A Lot  How much help is needed for bathing (which includes washing, rinsing, drying)?: A Lot  How much help is needed for toileting (which includes using toilet, bedpan, or urinal)?: A Lot  How much help is needed for putting on and taking off regular upper body clothing?: A Little  How much help is needed for taking care of personal grooming?: A Little  How much help for eating meals?: None  AM-PAC Inpatient Daily Activity Raw Score: 16  AM-PAC Inpatient ADL T-Scale Score : 35.96  ADL Inpatient CMS 0-100% Score: 53.32  ADL Inpatient CMS G-Code Modifier : CK    Tinneti Score       Goals  Short Term Goals  Time Frame for Short Term Goals: prior to D/C  Short Term Goal 1: complete functional mobility and transfers with SBA  Short Term Goal 2: complete bed mobility with supervision  Short Term Goal 3: complete bathing and dressing with Min A  Short Term Goal 4: complete toileting with Min A  Short Term Goal 5: tolerate B UE exercises x10 reps for increased strength w/ ADLs  Long Term Goals  Time Frame for Long Term Goals : STG=LTG  Patient Goals   Patient goals : return to PLOF       Therapy Time   Individual Concurrent Group Co-treatment   Time In 1014         Time Out 1054         Minutes 40         Timed Code Treatment Minutes: 25 Minutes (15 minute eval)       Neo Norris OTR/L     
    Lipids: No results found for: \"CHOL\", \"HDL\", \"TRIG\"  Hemoglobin A1C: No results found for: \"LABA1C\"  TSH: No results found for: \"TSH\"  Troponin: No results found for: \"TROPONINT\"  Lactic Acid: No results for input(s): \"LACTA\" in the last 72 hours.  BNP:   Recent Labs     08/09/24  0817   PROBNP 690*     UA:  Lab Results   Component Value Date/Time    NITRU Negative 08/10/2024 05:25 AM    COLORU Yellow 08/10/2024 05:25 AM    PHUR 5.5 08/10/2024 05:25 AM    WBCUA 3 08/10/2024 05:25 AM    RBCUA 9 08/10/2024 05:25 AM    BACTERIA None Seen 08/10/2024 05:25 AM    CLARITYU CLOUDY 08/10/2024 05:25 AM    LEUKOCYTESUR TRACE 08/10/2024 05:25 AM    UROBILINOGEN 0.2 08/10/2024 05:25 AM    BILIRUBINUR Negative 08/10/2024 05:25 AM    BLOODU Negative 08/10/2024 05:25 AM    GLUCOSEU Negative 08/10/2024 05:25 AM    KETUA TRACE 08/10/2024 05:25 AM     Urine Cultures: No results found for: \"LABURIN\"  Blood Cultures: No results found for: \"BC\"  No results found for: \"BLOODCULT2\"  Organism: No results found for: \"ORG\"      Electronically signed by Cade Gale MD on 8/11/2024 at 10:39 AM  Comment: Please note this report has been produced using speech recognition software and may contain errors related to that system including errors in grammar, punctuation, and spelling, as well as words and phrases that may be inappropriate. If there are any questions or concerns, please feel free to contact the dictating provider for clarification.

## 2024-08-19 ENCOUNTER — TELEPHONE (OUTPATIENT)
Dept: ORTHOPEDIC SURGERY | Age: 89
End: 2024-08-19

## 2024-08-19 NOTE — TELEPHONE ENCOUNTER
Appointment Request     Patient requesting earlier appointment: Yes  Appointment offered to patient: NO  Patient Contact Number: [phone]    NALDO CALLING FROM [facility] TO MAKE 1ST POST OP APPOINTMENT FOR PATIENT.    PATIENT HAD RT HIP SURGERY ON AUGUST 8, 2024.    [facility] ONLY HAS TRANSPORTATION ON WEDNESDAY AND FRIDAYS. NALDO REQUESTING [facility].    FIRST AVAILABLE APPOINTMENT IS SEPTEMBER 4, 2024.    PLEASE CALL BACK NALDO AT THE ABOVE NUMBER.

## 2024-09-04 ENCOUNTER — OFFICE VISIT (OUTPATIENT)
Dept: ORTHOPEDIC SURGERY | Age: 89
End: 2024-09-04
Payer: COMMERCIAL

## 2024-09-04 DIAGNOSIS — F17.200 CURRENT SMOKER: ICD-10-CM

## 2024-09-04 DIAGNOSIS — S72.001A CLOSED FRACTURE OF NECK OF RIGHT FEMUR, INITIAL ENCOUNTER (HCC): Primary | ICD-10-CM

## 2024-09-04 PROCEDURE — 99406 BEHAV CHNG SMOKING 3-10 MIN: CPT | Performed by: ORTHOPAEDIC SURGERY

## 2024-09-04 PROCEDURE — 99024 POSTOP FOLLOW-UP VISIT: CPT | Performed by: ORTHOPAEDIC SURGERY

## 2024-09-04 NOTE — PROGRESS NOTES
primary care physician for evaluation for osteoporosis, including consideration for DEXA scanning, if this is felt to be clinically indicated.  The patient is advised to contact the primary care physician to follow-up for further evaluation.       Robby Olson MD

## 2024-09-24 ENCOUNTER — TELEPHONE (OUTPATIENT)
Dept: ORTHOPEDIC SURGERY | Age: 89
End: 2024-09-24

## 2024-10-16 ENCOUNTER — OFFICE VISIT (OUTPATIENT)
Dept: ORTHOPEDIC SURGERY | Age: 89
End: 2024-10-16
Payer: COMMERCIAL

## 2024-10-16 VITALS — WEIGHT: 101.63 LBS | BODY MASS INDEX: 18.7 KG/M2 | HEIGHT: 62 IN

## 2024-10-16 DIAGNOSIS — S72.001A CLOSED FRACTURE OF NECK OF RIGHT FEMUR, INITIAL ENCOUNTER (HCC): Primary | ICD-10-CM

## 2024-10-16 DIAGNOSIS — F17.200 CURRENT SMOKER: ICD-10-CM

## 2024-10-16 PROCEDURE — 99406 BEHAV CHNG SMOKING 3-10 MIN: CPT | Performed by: ORTHOPAEDIC SURGERY

## 2024-10-16 PROCEDURE — 99024 POSTOP FOLLOW-UP VISIT: CPT | Performed by: ORTHOPAEDIC SURGERY

## 2024-10-16 NOTE — PROGRESS NOTES
DIAGNOSIS:  Right femur impacted neck fracture, status post Hip pinning with cannulated screws.    DATE OF SURGERY:  8/9/2024 .    HISTORY OF PRESENT ILLNESS: Ms. Gottlieb 89 y.o.  female  who came in today for 2 months postoperative visit.  The patient denies any significant pain in the right hip, 0/10. She has been working on ROM and WB.  No numbness or tingling sensation. No fever or Chills.     PHYSICAL EXAMINATION:  The incision is healed well, right hip.  No signs of any erythema or drainage.  She has no pain with the active or passive range of motion of the right  hip.  She has intact sensation, distally, and  is neurovascularly intact.    IMAGING:  Two views right hip and femur, and AP pelvis showed good alignment of the impacted femoral neck fracture, 3 cannulated screws in good position, no loosening, or hardware failure.      IMPRESSION:  2 months out from right Hip pinning with cannulated screws, and doing very well.    PLAN:  I have told the patient to work on ROM with  PT, WBAT as well as strengthening exercises.  The patient will come back for a follow up in 6 weeks.  At that time, we will take Two views right  Hip, and AP pelvis.    The patient smokes cigarettes, and we discussed with the patient the risks of smoking on general health and also on bone and soft tissue healing (delay and non-union), and promised to cut down or stop smoking.     Smoking: Educated the patient regarding the hazards of smoking and that it harms their body in many ways. It increases the chance of developing heart disease, lung disease, cancer, and other health problems including poor bone and wound healing.    The importance of smoking cessation for optimal bone and wound healing was stressed. This was communicated verbally, 5 Minutes.    As this patient has demonstrated risk factors for osteoporosis, such as age greater than fifty years and evidence of a fracture, I have referred the patient back to the primary

## 2024-11-04 NOTE — ED NOTES
Daughter at bedside.  
Pt out to ct scan.  
Pt placed on external roy cath.   
Pt undressed and in gown, just informed pt going straight to surg. Transport here to move pt. Daughter has belongings, shirt, bra, pants, underwear and shoes placed in bag and pt gave daughter her rings.  
Report provided to Anitra KRUEGER [id number]  
Detail Level: Zone
Detail Level: Detailed

## 2025-04-12 ENCOUNTER — HOSPITAL ENCOUNTER (INPATIENT)
Age: 89
LOS: 3 days | Discharge: HOME | DRG: 871 | End: 2025-04-16
Attending: HOSPITALIST | Admitting: HOSPITALIST
Payer: MEDICARE

## 2025-04-12 ENCOUNTER — APPOINTMENT (OUTPATIENT)
Dept: GENERAL RADIOLOGY | Age: 89
DRG: 871 | End: 2025-04-12
Payer: MEDICARE

## 2025-04-12 ENCOUNTER — APPOINTMENT (OUTPATIENT)
Dept: CT IMAGING | Age: 89
DRG: 871 | End: 2025-04-12
Payer: MEDICARE

## 2025-04-12 DIAGNOSIS — J44.1 COPD EXACERBATION (HCC): ICD-10-CM

## 2025-04-12 DIAGNOSIS — J96.01 ACUTE RESPIRATORY FAILURE WITH HYPOXIA: ICD-10-CM

## 2025-04-12 DIAGNOSIS — J18.9 PNEUMONIA OF RIGHT LOWER LOBE DUE TO INFECTIOUS ORGANISM: Primary | ICD-10-CM

## 2025-04-12 LAB
ALBUMIN SERPL-MCNC: 4 G/DL (ref 3.4–5)
ALBUMIN/GLOB SERPL: 1.3 {RATIO} (ref 1.1–2.2)
ALP SERPL-CCNC: 85 U/L (ref 40–129)
ALT SERPL-CCNC: 10 U/L (ref 10–40)
ANION GAP SERPL CALCULATED.3IONS-SCNC: 10 MMOL/L (ref 3–16)
AST SERPL-CCNC: 24 U/L (ref 15–37)
BASE EXCESS BLDV CALC-SCNC: 4.2 MMOL/L
BASOPHILS # BLD: 0.1 K/UL (ref 0–0.2)
BASOPHILS NFR BLD: 0.5 %
BILIRUB SERPL-MCNC: 0.3 MG/DL (ref 0–1)
BUN SERPL-MCNC: 29 MG/DL (ref 7–20)
CALCIUM SERPL-MCNC: 9.4 MG/DL (ref 8.3–10.6)
CHLORIDE SERPL-SCNC: 103 MMOL/L (ref 99–110)
CO2 BLDV-SCNC: 31 MMOL/L
CO2 SERPL-SCNC: 26 MMOL/L (ref 21–32)
COHGB MFR BLDV: 2.5 %
CREAT SERPL-MCNC: 0.7 MG/DL (ref 0.6–1.2)
DEPRECATED RDW RBC AUTO: 18.8 % (ref 12.4–15.4)
EOSINOPHIL # BLD: 0.2 K/UL (ref 0–0.6)
EOSINOPHIL NFR BLD: 1.2 %
FLUAV + FLUBV AG NOSE IA.RAPID: NOT DETECTED
FLUAV + FLUBV AG NOSE IA.RAPID: NOT DETECTED
GFR SERPLBLD CREATININE-BSD FMLA CKD-EPI: 83 ML/MIN/{1.73_M2}
GLUCOSE SERPL-MCNC: 89 MG/DL (ref 70–99)
HCO3 BLDV-SCNC: 30 MMOL/L (ref 23–29)
HCT VFR BLD AUTO: 37.9 % (ref 36–48)
HGB BLD-MCNC: 12.5 G/DL (ref 12–16)
LYMPHOCYTES # BLD: 1.3 K/UL (ref 1–5.1)
LYMPHOCYTES NFR BLD: 10.4 %
MAGNESIUM SERPL-MCNC: 2.16 MG/DL (ref 1.8–2.4)
MCH RBC QN AUTO: 29.7 PG (ref 26–34)
MCHC RBC AUTO-ENTMCNC: 33 G/DL (ref 31–36)
MCV RBC AUTO: 90.1 FL (ref 80–100)
METHGB MFR BLDV: 0.7 %
MONOCYTES # BLD: 1.3 K/UL (ref 0–1.3)
MONOCYTES NFR BLD: 10.4 %
NEUTROPHILS # BLD: 9.4 K/UL (ref 1.7–7.7)
NEUTROPHILS NFR BLD: 77.5 %
O2 THERAPY: ABNORMAL
PCO2 BLDV: 48.1 MMHG (ref 40–50)
PH BLDV: 7.4 [PH] (ref 7.35–7.45)
PLATELET # BLD AUTO: 223 K/UL (ref 135–450)
PMV BLD AUTO: 8.6 FL (ref 5–10.5)
PO2 BLDV: <30 MMHG
POTASSIUM SERPL-SCNC: 4.2 MMOL/L (ref 3.5–5.1)
PROT SERPL-MCNC: 7 G/DL (ref 6.4–8.2)
RBC # BLD AUTO: 4.21 M/UL (ref 4–5.2)
RSV AG NOSE QL: NEGATIVE
SAO2 % BLDV: 30 %
SARS-COV-2 RDRP RESP QL NAA+PROBE: NOT DETECTED
SODIUM SERPL-SCNC: 139 MMOL/L (ref 136–145)
TROPONIN, HIGH SENSITIVITY: 15 NG/L (ref 0–14)
TROPONIN, HIGH SENSITIVITY: 17 NG/L (ref 0–14)
WBC # BLD AUTO: 12.1 K/UL (ref 4–11)

## 2025-04-12 PROCEDURE — 2500000003 HC RX 250 WO HCPCS: Performed by: PHYSICIAN ASSISTANT

## 2025-04-12 PROCEDURE — 36415 COLL VENOUS BLD VENIPUNCTURE: CPT

## 2025-04-12 PROCEDURE — 6360000004 HC RX CONTRAST MEDICATION: Performed by: PHYSICIAN ASSISTANT

## 2025-04-12 PROCEDURE — 80053 COMPREHEN METABOLIC PANEL: CPT

## 2025-04-12 PROCEDURE — 99285 EMERGENCY DEPT VISIT HI MDM: CPT

## 2025-04-12 PROCEDURE — 6360000002 HC RX W HCPCS: Performed by: PHYSICIAN ASSISTANT

## 2025-04-12 PROCEDURE — 94640 AIRWAY INHALATION TREATMENT: CPT

## 2025-04-12 PROCEDURE — 93005 ELECTROCARDIOGRAM TRACING: CPT | Performed by: HOSPITALIST

## 2025-04-12 PROCEDURE — 87635 SARS-COV-2 COVID-19 AMP PRB: CPT

## 2025-04-12 PROCEDURE — 71260 CT THORAX DX C+: CPT

## 2025-04-12 PROCEDURE — 85025 COMPLETE CBC W/AUTO DIFF WBC: CPT

## 2025-04-12 PROCEDURE — 87040 BLOOD CULTURE FOR BACTERIA: CPT

## 2025-04-12 PROCEDURE — 84484 ASSAY OF TROPONIN QUANT: CPT

## 2025-04-12 PROCEDURE — 83735 ASSAY OF MAGNESIUM: CPT

## 2025-04-12 PROCEDURE — 96365 THER/PROPH/DIAG IV INF INIT: CPT

## 2025-04-12 PROCEDURE — 71045 X-RAY EXAM CHEST 1 VIEW: CPT

## 2025-04-12 PROCEDURE — 83605 ASSAY OF LACTIC ACID: CPT

## 2025-04-12 PROCEDURE — 82803 BLOOD GASES ANY COMBINATION: CPT

## 2025-04-12 PROCEDURE — 96375 TX/PRO/DX INJ NEW DRUG ADDON: CPT

## 2025-04-12 PROCEDURE — 2700000000 HC OXYGEN THERAPY PER DAY

## 2025-04-12 PROCEDURE — 6370000000 HC RX 637 (ALT 250 FOR IP): Performed by: PHYSICIAN ASSISTANT

## 2025-04-12 PROCEDURE — 87807 RSV ASSAY W/OPTIC: CPT

## 2025-04-12 PROCEDURE — 2580000003 HC RX 258: Performed by: PHYSICIAN ASSISTANT

## 2025-04-12 PROCEDURE — 94761 N-INVAS EAR/PLS OXIMETRY MLT: CPT

## 2025-04-12 PROCEDURE — 87502 INFLUENZA DNA AMP PROBE: CPT

## 2025-04-12 RX ORDER — ALBUTEROL SULFATE 0.83 MG/ML
5 SOLUTION RESPIRATORY (INHALATION) ONCE
Status: COMPLETED | OUTPATIENT
Start: 2025-04-12 | End: 2025-04-12

## 2025-04-12 RX ORDER — IOPAMIDOL 755 MG/ML
75 INJECTION, SOLUTION INTRAVASCULAR
Status: COMPLETED | OUTPATIENT
Start: 2025-04-12 | End: 2025-04-12

## 2025-04-12 RX ORDER — IPRATROPIUM BROMIDE AND ALBUTEROL SULFATE 2.5; .5 MG/3ML; MG/3ML
1 SOLUTION RESPIRATORY (INHALATION) ONCE
Status: COMPLETED | OUTPATIENT
Start: 2025-04-12 | End: 2025-04-12

## 2025-04-12 RX ORDER — METHYLPREDNISOLONE SODIUM SUCCINATE 125 MG/2ML
80 INJECTION INTRAMUSCULAR; INTRAVENOUS ONCE
Status: COMPLETED | OUTPATIENT
Start: 2025-04-12 | End: 2025-04-12

## 2025-04-12 RX ADMIN — WATER 1000 MG: 1 INJECTION INTRAMUSCULAR; INTRAVENOUS; SUBCUTANEOUS at 23:43

## 2025-04-12 RX ADMIN — AZITHROMYCIN MONOHYDRATE 500 MG: 500 INJECTION, POWDER, LYOPHILIZED, FOR SOLUTION INTRAVENOUS at 23:49

## 2025-04-12 RX ADMIN — IOPAMIDOL 75 ML: 755 INJECTION, SOLUTION INTRAVENOUS at 22:25

## 2025-04-12 RX ADMIN — ALBUTEROL SULFATE 5 MG: 2.5 SOLUTION RESPIRATORY (INHALATION) at 20:32

## 2025-04-12 RX ADMIN — ALBUTEROL SULFATE 5 MG: 2.5 SOLUTION RESPIRATORY (INHALATION) at 22:39

## 2025-04-12 RX ADMIN — IPRATROPIUM BROMIDE AND ALBUTEROL SULFATE 1 DOSE: .5; 3 SOLUTION RESPIRATORY (INHALATION) at 22:40

## 2025-04-12 RX ADMIN — METHYLPREDNISOLONE SODIUM SUCCINATE 80 MG: 125 INJECTION INTRAMUSCULAR; INTRAVENOUS at 20:35

## 2025-04-12 RX ADMIN — IPRATROPIUM BROMIDE AND ALBUTEROL SULFATE 1 DOSE: .5; 3 SOLUTION RESPIRATORY (INHALATION) at 20:32

## 2025-04-12 ASSESSMENT — PAIN - FUNCTIONAL ASSESSMENT: PAIN_FUNCTIONAL_ASSESSMENT: NONE - DENIES PAIN

## 2025-04-12 NOTE — ED PROVIDER NOTES
[facility] EMERGENCY DEPARTMENT  EMERGENCY DEPARTMENT ENCOUNTER        Pt Name: Altagracia Gottlieb  MRN: [id number]  Birthdate 1935  Date of evaluation: 4/12/2025  Provider: FRANCISCO Herrera  PCP: No primary care provider on file.  Note Started: 7:12 PM EDT 4/12/25      MIGUEL. I have evaluated this patient.        CHIEF COMPLAINT       Chief Complaint   Patient presents with    Shortness of Breath     Pt to ED with SOB whose onset was last night and increased today.  Green productive cough yesterday.  Pt denies fever.       HISTORY OF PRESENT ILLNESS: 1 or more Elements     History From: Patient with her daughter at bedside.      Altagracia Gottlieb is a 89 y.o. female with past medical history of sleep apnea COPD coronary arteriosclerosis, nicotine dependence, hyperlipidemia who presents for evaluation of shortness of breath with a green productive cough.  Her symptoms started about 2 days ago.  She continues to smoke tobacco.  She denies significant chest pain.  Denies significant dyspnea just progressively worsening over the last 2 days with this productive cough.  She has no other acute concerns or complaints at this time.  She tried albuterol and it helped a little bit.    Nursing Notes were all reviewed and agreed with or any disagreements were addressed in the HPI.    REVIEW OF SYSTEMS :      Review of Systems    Positives and Pertinent negatives as per HPI.     SURGICAL HISTORY     Past Surgical History:   Procedure Laterality Date    HIP SURGERY Right 8/9/2024    RIGHT HIP PINNING performed by Robby Olson MD at [facility] [place] OR       CURRENTMEDICATIONS       Previous Medications    ALBUTEROL SULFATE HFA (VENTOLIN HFA) 108 (90 BASE) MCG/ACT INHALER    Inhale 2 puffs into the lungs every 6 hours as needed for Wheezing    RIVAROXABAN (XARELTO) 10 MG TABS TABLET    Take 1 tablet by mouth daily for 28 doses       ALLERGIES     Gluten    FAMILYHISTORY     No family history on file.     SOCIAL HISTORY

## 2025-04-13 PROBLEM — J96.01 ACUTE HYPOXEMIC RESPIRATORY FAILURE: Status: ACTIVE | Noted: 2025-04-13

## 2025-04-13 LAB
BASE EXCESS BLDV CALC-SCNC: -2.5 MMOL/L
CO2 BLDV-SCNC: 23 MMOL/L
COHGB MFR BLDV: 1.7 %
EKG ATRIAL RATE: 83 BPM
EKG DIAGNOSIS: NORMAL
EKG P AXIS: 69 DEGREES
EKG P-R INTERVAL: 130 MS
EKG Q-T INTERVAL: 348 MS
EKG QRS DURATION: 78 MS
EKG QTC CALCULATION (BAZETT): 408 MS
EKG R AXIS: 58 DEGREES
EKG T AXIS: 115 DEGREES
EKG VENTRICULAR RATE: 83 BPM
HCO3 BLDV-SCNC: 22 MMOL/L (ref 23–29)
LACTATE BLDV-SCNC: 1.2 MMOL/L (ref 0.4–1.9)
LACTATE BLDV-SCNC: 6.3 MMOL/L (ref 0.4–2)
LACTATE BLDV-SCNC: 6.4 MMOL/L (ref 0.4–1.9)
METHGB MFR BLDV: 0.4 %
MRSA DNA SPEC QL NAA+PROBE: NORMAL
O2 THERAPY: ABNORMAL
ORGANISM: ABNORMAL
PCO2 BLDV: 36.2 MMHG (ref 40–50)
PH BLDV: 7.39 [PH] (ref 7.35–7.45)
PO2 BLDV: 82 MMHG
REPORT: NORMAL
RESP PATH DNA+RNA PNL L RESP NAA+NON-PRB: ABNORMAL
SAO2 % BLDV: 97 %
TROPONIN, HIGH SENSITIVITY: 12 NG/L (ref 0–14)
TROPONIN, HIGH SENSITIVITY: 14 NG/L (ref 0–14)

## 2025-04-13 PROCEDURE — 83605 ASSAY OF LACTIC ACID: CPT

## 2025-04-13 PROCEDURE — 94640 AIRWAY INHALATION TREATMENT: CPT

## 2025-04-13 PROCEDURE — 2500000003 HC RX 250 WO HCPCS: Performed by: HOSPITALIST

## 2025-04-13 PROCEDURE — 36415 COLL VENOUS BLD VENIPUNCTURE: CPT

## 2025-04-13 PROCEDURE — 2500000003 HC RX 250 WO HCPCS

## 2025-04-13 PROCEDURE — 1200000000 HC SEMI PRIVATE

## 2025-04-13 PROCEDURE — 93010 ELECTROCARDIOGRAM REPORT: CPT | Performed by: INTERNAL MEDICINE

## 2025-04-13 PROCEDURE — 87641 MR-STAPH DNA AMP PROBE: CPT

## 2025-04-13 PROCEDURE — 6370000000 HC RX 637 (ALT 250 FOR IP): Performed by: HOSPITALIST

## 2025-04-13 PROCEDURE — 2700000000 HC OXYGEN THERAPY PER DAY

## 2025-04-13 PROCEDURE — 94761 N-INVAS EAR/PLS OXIMETRY MLT: CPT

## 2025-04-13 PROCEDURE — 82803 BLOOD GASES ANY COMBINATION: CPT

## 2025-04-13 PROCEDURE — 2580000003 HC RX 258: Performed by: HOSPITALIST

## 2025-04-13 PROCEDURE — 84484 ASSAY OF TROPONIN QUANT: CPT

## 2025-04-13 PROCEDURE — 6360000002 HC RX W HCPCS: Performed by: HOSPITALIST

## 2025-04-13 PROCEDURE — 87633 RESP VIRUS 12-25 TARGETS: CPT

## 2025-04-13 RX ORDER — IPRATROPIUM BROMIDE AND ALBUTEROL SULFATE 2.5; .5 MG/3ML; MG/3ML
1 SOLUTION RESPIRATORY (INHALATION)
Status: DISCONTINUED | OUTPATIENT
Start: 2025-04-13 | End: 2025-04-16 | Stop reason: HOSPADM

## 2025-04-13 RX ORDER — SODIUM CHLORIDE 0.9 % (FLUSH) 0.9 %
5-40 SYRINGE (ML) INJECTION PRN
Status: DISCONTINUED | OUTPATIENT
Start: 2025-04-13 | End: 2025-04-16 | Stop reason: HOSPADM

## 2025-04-13 RX ORDER — ACETAMINOPHEN 650 MG/1
650 SUPPOSITORY RECTAL EVERY 6 HOURS PRN
Status: DISCONTINUED | OUTPATIENT
Start: 2025-04-13 | End: 2025-04-16 | Stop reason: HOSPADM

## 2025-04-13 RX ORDER — POTASSIUM CHLORIDE 7.45 MG/ML
10 INJECTION INTRAVENOUS PRN
Status: DISCONTINUED | OUTPATIENT
Start: 2025-04-13 | End: 2025-04-16 | Stop reason: HOSPADM

## 2025-04-13 RX ORDER — POLYETHYLENE GLYCOL 3350 17 G/17G
17 POWDER, FOR SOLUTION ORAL DAILY PRN
Status: DISCONTINUED | OUTPATIENT
Start: 2025-04-13 | End: 2025-04-16 | Stop reason: HOSPADM

## 2025-04-13 RX ORDER — METHYLPREDNISOLONE SODIUM SUCCINATE 125 MG/2ML
40 INJECTION INTRAMUSCULAR; INTRAVENOUS DAILY
Status: DISCONTINUED | OUTPATIENT
Start: 2025-04-13 | End: 2025-04-16 | Stop reason: HOSPADM

## 2025-04-13 RX ORDER — ONDANSETRON 2 MG/ML
4 INJECTION INTRAMUSCULAR; INTRAVENOUS EVERY 6 HOURS PRN
Status: DISCONTINUED | OUTPATIENT
Start: 2025-04-13 | End: 2025-04-16 | Stop reason: HOSPADM

## 2025-04-13 RX ORDER — NICOTINE 21 MG/24HR
1 PATCH, TRANSDERMAL 24 HOURS TRANSDERMAL DAILY
Status: DISCONTINUED | OUTPATIENT
Start: 2025-04-13 | End: 2025-04-16 | Stop reason: HOSPADM

## 2025-04-13 RX ORDER — SODIUM CHLORIDE 9 MG/ML
INJECTION, SOLUTION INTRAVENOUS PRN
Status: DISCONTINUED | OUTPATIENT
Start: 2025-04-13 | End: 2025-04-16 | Stop reason: HOSPADM

## 2025-04-13 RX ORDER — POTASSIUM CHLORIDE 1500 MG/1
40 TABLET, EXTENDED RELEASE ORAL PRN
Status: DISCONTINUED | OUTPATIENT
Start: 2025-04-13 | End: 2025-04-16 | Stop reason: HOSPADM

## 2025-04-13 RX ORDER — ACETAMINOPHEN 325 MG/1
650 TABLET ORAL EVERY 6 HOURS PRN
Status: DISCONTINUED | OUTPATIENT
Start: 2025-04-13 | End: 2025-04-16 | Stop reason: HOSPADM

## 2025-04-13 RX ORDER — SODIUM CHLORIDE 0.9 % (FLUSH) 0.9 %
5-40 SYRINGE (ML) INJECTION EVERY 12 HOURS SCHEDULED
Status: DISCONTINUED | OUTPATIENT
Start: 2025-04-13 | End: 2025-04-16 | Stop reason: HOSPADM

## 2025-04-13 RX ORDER — WATER 10 ML/10ML
INJECTION INTRAMUSCULAR; INTRAVENOUS; SUBCUTANEOUS
Status: COMPLETED
Start: 2025-04-13 | End: 2025-04-13

## 2025-04-13 RX ORDER — ONDANSETRON 4 MG/1
4 TABLET, ORALLY DISINTEGRATING ORAL EVERY 8 HOURS PRN
Status: DISCONTINUED | OUTPATIENT
Start: 2025-04-13 | End: 2025-04-16 | Stop reason: HOSPADM

## 2025-04-13 RX ORDER — ENOXAPARIN SODIUM 100 MG/ML
30 INJECTION SUBCUTANEOUS DAILY
Status: DISCONTINUED | OUTPATIENT
Start: 2025-04-13 | End: 2025-04-13

## 2025-04-13 RX ORDER — MAGNESIUM SULFATE IN WATER 40 MG/ML
2000 INJECTION, SOLUTION INTRAVENOUS PRN
Status: DISCONTINUED | OUTPATIENT
Start: 2025-04-13 | End: 2025-04-16 | Stop reason: HOSPADM

## 2025-04-13 RX ORDER — ALBUTEROL SULFATE 90 UG/1
2 INHALANT RESPIRATORY (INHALATION) EVERY 6 HOURS PRN
Status: DISCONTINUED | OUTPATIENT
Start: 2025-04-13 | End: 2025-04-16 | Stop reason: HOSPADM

## 2025-04-13 RX ORDER — PREDNISONE 20 MG/1
40 TABLET ORAL DAILY
Status: DISCONTINUED | OUTPATIENT
Start: 2025-04-13 | End: 2025-04-13

## 2025-04-13 RX ADMIN — SODIUM CHLORIDE, PRESERVATIVE FREE 10 ML: 5 INJECTION INTRAVENOUS at 23:10

## 2025-04-13 RX ADMIN — SODIUM CHLORIDE, PRESERVATIVE FREE 10 ML: 5 INJECTION INTRAVENOUS at 09:26

## 2025-04-13 RX ADMIN — IPRATROPIUM BROMIDE AND ALBUTEROL SULFATE 1 DOSE: .5; 3 SOLUTION RESPIRATORY (INHALATION) at 15:33

## 2025-04-13 RX ADMIN — AZITHROMYCIN MONOHYDRATE 500 MG: 500 INJECTION, POWDER, LYOPHILIZED, FOR SOLUTION INTRAVENOUS at 23:10

## 2025-04-13 RX ADMIN — IPRATROPIUM BROMIDE AND ALBUTEROL SULFATE 1 DOSE: .5; 3 SOLUTION RESPIRATORY (INHALATION) at 11:11

## 2025-04-13 RX ADMIN — WATER: 1 INJECTION INTRAMUSCULAR; INTRAVENOUS; SUBCUTANEOUS at 09:35

## 2025-04-13 RX ADMIN — METHYLPREDNISOLONE SODIUM SUCCINATE 40 MG: 125 INJECTION INTRAMUSCULAR; INTRAVENOUS at 09:25

## 2025-04-13 RX ADMIN — ENOXAPARIN SODIUM 30 MG: 100 INJECTION SUBCUTANEOUS at 09:26

## 2025-04-13 RX ADMIN — WATER 1000 MG: 1 INJECTION INTRAMUSCULAR; INTRAVENOUS; SUBCUTANEOUS at 23:10

## 2025-04-13 RX ADMIN — IPRATROPIUM BROMIDE AND ALBUTEROL SULFATE 1 DOSE: .5; 3 SOLUTION RESPIRATORY (INHALATION) at 20:39

## 2025-04-14 ENCOUNTER — APPOINTMENT (OUTPATIENT)
Dept: CT IMAGING | Age: 89
DRG: 871 | End: 2025-04-14
Payer: MEDICARE

## 2025-04-14 LAB
ANION GAP SERPL CALCULATED.3IONS-SCNC: 11 MMOL/L (ref 3–16)
BACTERIA URNS QL MICRO: ABNORMAL /HPF
BASOPHILS # BLD: 0 K/UL (ref 0–0.2)
BASOPHILS NFR BLD: 0.1 %
BILIRUB UR QL STRIP.AUTO: NEGATIVE
BUN SERPL-MCNC: 26 MG/DL (ref 7–20)
CALCIUM SERPL-MCNC: 8.8 MG/DL (ref 8.3–10.6)
CHLORIDE SERPL-SCNC: 105 MMOL/L (ref 99–110)
CLARITY UR: ABNORMAL
CO2 SERPL-SCNC: 22 MMOL/L (ref 21–32)
COLOR UR: ABNORMAL
CREAT SERPL-MCNC: 0.6 MG/DL (ref 0.6–1.2)
DEPRECATED RDW RBC AUTO: 17.8 % (ref 12.4–15.4)
EOSINOPHIL # BLD: 0 K/UL (ref 0–0.6)
EOSINOPHIL NFR BLD: 0 %
EPI CELLS #/AREA URNS AUTO: 1 /HPF (ref 0–5)
GFR SERPLBLD CREATININE-BSD FMLA CKD-EPI: 86 ML/MIN/{1.73_M2}
GLUCOSE SERPL-MCNC: 117 MG/DL (ref 70–99)
GLUCOSE UR STRIP.AUTO-MCNC: NEGATIVE MG/DL
HCT VFR BLD AUTO: 32.7 % (ref 36–48)
HGB BLD-MCNC: 10.9 G/DL (ref 12–16)
HGB UR QL STRIP.AUTO: ABNORMAL
HYALINE CASTS #/AREA URNS AUTO: 0 /LPF (ref 0–8)
KETONES UR STRIP.AUTO-MCNC: NEGATIVE MG/DL
LACTATE BLDV-SCNC: 2.4 MMOL/L (ref 0.4–2)
LEUKOCYTE ESTERASE UR QL STRIP.AUTO: ABNORMAL
LYMPHOCYTES # BLD: 1.1 K/UL (ref 1–5.1)
LYMPHOCYTES NFR BLD: 8.1 %
MCH RBC QN AUTO: 29.4 PG (ref 26–34)
MCHC RBC AUTO-ENTMCNC: 33.2 G/DL (ref 31–36)
MCV RBC AUTO: 88.6 FL (ref 80–100)
MONOCYTES # BLD: 0.9 K/UL (ref 0–1.3)
MONOCYTES NFR BLD: 6.6 %
NEUTROPHILS # BLD: 11.3 K/UL (ref 1.7–7.7)
NEUTROPHILS NFR BLD: 85.2 %
NITRITE UR QL STRIP.AUTO: NEGATIVE
PH UR STRIP.AUTO: 5.5 [PH] (ref 5–8)
PLATELET # BLD AUTO: 210 K/UL (ref 135–450)
PMV BLD AUTO: 8.7 FL (ref 5–10.5)
POTASSIUM SERPL-SCNC: 4.4 MMOL/L (ref 3.5–5.1)
PROT UR STRIP.AUTO-MCNC: 100 MG/DL
RBC # BLD AUTO: 3.7 M/UL (ref 4–5.2)
RBC CLUMPS #/AREA URNS AUTO: 2007 /HPF (ref 0–4)
SODIUM SERPL-SCNC: 138 MMOL/L (ref 136–145)
SP GR UR STRIP.AUTO: 1.02 (ref 1–1.03)
UA COMPLETE W REFLEX CULTURE PNL UR: YES
UA DIPSTICK W REFLEX MICRO PNL UR: YES
URN SPEC COLLECT METH UR: ABNORMAL
UROBILINOGEN UR STRIP-ACNC: 0.2 E.U./DL
WBC # BLD AUTO: 13.2 K/UL (ref 4–11)
WBC #/AREA URNS AUTO: 11 /HPF (ref 0–5)

## 2025-04-14 PROCEDURE — 6370000000 HC RX 637 (ALT 250 FOR IP): Performed by: NURSE PRACTITIONER

## 2025-04-14 PROCEDURE — 94640 AIRWAY INHALATION TREATMENT: CPT

## 2025-04-14 PROCEDURE — 6360000002 HC RX W HCPCS: Performed by: HOSPITALIST

## 2025-04-14 PROCEDURE — 99223 1ST HOSP IP/OBS HIGH 75: CPT | Performed by: INTERNAL MEDICINE

## 2025-04-14 PROCEDURE — 6360000004 HC RX CONTRAST MEDICATION

## 2025-04-14 PROCEDURE — 36415 COLL VENOUS BLD VENIPUNCTURE: CPT

## 2025-04-14 PROCEDURE — 83605 ASSAY OF LACTIC ACID: CPT

## 2025-04-14 PROCEDURE — 97530 THERAPEUTIC ACTIVITIES: CPT

## 2025-04-14 PROCEDURE — 2700000000 HC OXYGEN THERAPY PER DAY

## 2025-04-14 PROCEDURE — 6370000000 HC RX 637 (ALT 250 FOR IP): Performed by: HOSPITALIST

## 2025-04-14 PROCEDURE — 2500000003 HC RX 250 WO HCPCS: Performed by: HOSPITALIST

## 2025-04-14 PROCEDURE — 97165 OT EVAL LOW COMPLEX 30 MIN: CPT

## 2025-04-14 PROCEDURE — 94761 N-INVAS EAR/PLS OXIMETRY MLT: CPT

## 2025-04-14 PROCEDURE — 2580000003 HC RX 258: Performed by: HOSPITALIST

## 2025-04-14 PROCEDURE — 6370000000 HC RX 637 (ALT 250 FOR IP): Performed by: INTERNAL MEDICINE

## 2025-04-14 PROCEDURE — 80048 BASIC METABOLIC PNL TOTAL CA: CPT

## 2025-04-14 PROCEDURE — 94669 MECHANICAL CHEST WALL OSCILL: CPT

## 2025-04-14 PROCEDURE — 87086 URINE CULTURE/COLONY COUNT: CPT

## 2025-04-14 PROCEDURE — 1200000000 HC SEMI PRIVATE

## 2025-04-14 PROCEDURE — 74178 CT ABD&PLV WO CNTR FLWD CNTR: CPT

## 2025-04-14 PROCEDURE — 85025 COMPLETE CBC W/AUTO DIFF WBC: CPT

## 2025-04-14 PROCEDURE — 81001 URINALYSIS AUTO W/SCOPE: CPT

## 2025-04-14 PROCEDURE — 2500000003 HC RX 250 WO HCPCS

## 2025-04-14 RX ORDER — IOPAMIDOL 755 MG/ML
75 INJECTION, SOLUTION INTRAVASCULAR
Status: COMPLETED | OUTPATIENT
Start: 2025-04-14 | End: 2025-04-14

## 2025-04-14 RX ORDER — WATER 10 ML/10ML
INJECTION INTRAMUSCULAR; INTRAVENOUS; SUBCUTANEOUS
Status: COMPLETED
Start: 2025-04-14 | End: 2025-04-14

## 2025-04-14 RX ORDER — GUAIFENESIN 600 MG/1
600 TABLET, EXTENDED RELEASE ORAL 2 TIMES DAILY
Status: DISCONTINUED | OUTPATIENT
Start: 2025-04-14 | End: 2025-04-16 | Stop reason: HOSPADM

## 2025-04-14 RX ORDER — GUAIFENESIN/DEXTROMETHORPHAN 100-10MG/5
10 SYRUP ORAL EVERY 4 HOURS PRN
Status: DISCONTINUED | OUTPATIENT
Start: 2025-04-14 | End: 2025-04-16 | Stop reason: HOSPADM

## 2025-04-14 RX ADMIN — IOPAMIDOL 75 ML: 755 INJECTION, SOLUTION INTRAVENOUS at 14:50

## 2025-04-14 RX ADMIN — IPRATROPIUM BROMIDE AND ALBUTEROL SULFATE 1 DOSE: .5; 3 SOLUTION RESPIRATORY (INHALATION) at 17:03

## 2025-04-14 RX ADMIN — SODIUM CHLORIDE, PRESERVATIVE FREE 10 ML: 5 INJECTION INTRAVENOUS at 09:10

## 2025-04-14 RX ADMIN — WATER 10 ML: 1 INJECTION INTRAMUSCULAR; INTRAVENOUS; SUBCUTANEOUS at 09:26

## 2025-04-14 RX ADMIN — GUAIFENESIN 600 MG: 600 TABLET ORAL at 21:23

## 2025-04-14 RX ADMIN — ALBUTEROL SULFATE 2 PUFF: 90 AEROSOL, METERED RESPIRATORY (INHALATION) at 12:58

## 2025-04-14 RX ADMIN — WATER 1000 MG: 1 INJECTION INTRAMUSCULAR; INTRAVENOUS; SUBCUTANEOUS at 21:13

## 2025-04-14 RX ADMIN — METHYLPREDNISOLONE SODIUM SUCCINATE 40 MG: 125 INJECTION INTRAMUSCULAR; INTRAVENOUS at 09:10

## 2025-04-14 RX ADMIN — SODIUM CHLORIDE, PRESERVATIVE FREE 10 ML: 5 INJECTION INTRAVENOUS at 21:16

## 2025-04-14 RX ADMIN — SODIUM CHLORIDE 250 ML: 0.9 INJECTION, SOLUTION INTRAVENOUS at 21:13

## 2025-04-14 RX ADMIN — IPRATROPIUM BROMIDE AND ALBUTEROL SULFATE 1 DOSE: .5; 3 SOLUTION RESPIRATORY (INHALATION) at 19:53

## 2025-04-14 RX ADMIN — TIOTROPIUM BROMIDE AND OLODATEROL 2 PUFF: 3.124; 2.736 SPRAY, METERED RESPIRATORY (INHALATION) at 12:58

## 2025-04-14 RX ADMIN — IPRATROPIUM BROMIDE AND ALBUTEROL SULFATE 1 DOSE: .5; 3 SOLUTION RESPIRATORY (INHALATION) at 08:08

## 2025-04-14 RX ADMIN — GUAIFENESIN 600 MG: 600 TABLET ORAL at 09:10

## 2025-04-14 RX ADMIN — IPRATROPIUM BROMIDE AND ALBUTEROL SULFATE 1 DOSE: .5; 3 SOLUTION RESPIRATORY (INHALATION) at 11:28

## 2025-04-14 RX ADMIN — AZITHROMYCIN MONOHYDRATE 500 MG: 500 INJECTION, POWDER, LYOPHILIZED, FOR SOLUTION INTRAVENOUS at 21:15

## 2025-04-14 RX ADMIN — GUAIFENESIN SYRUP AND DEXTROMETHORPHAN 10 ML: 100; 10 SYRUP ORAL at 04:13

## 2025-04-14 NOTE — CONSULTS
REASON FOR CONSULTATION/CC: Pneumonia      Consult at request of Lorri Ann MD     PCP: No primary care provider on file.      Chief Complaint   Patient presents with    Shortness of Breath     Pt to ED with SOB whose onset was last night and increased today.  Green productive cough yesterday.  Pt denies fever.       HISTORY OF PRESENT ILLNESS: Altagracia Gottlieb is a 89 y.o. year old female with a history of severe emphysema who presents with shortness of breath with increased cough and increased sputum production.  Denies fever.  CT showed background extensive emphysema with some right lower lobe mucous plugging and possible infiltration.  Pneumonia panel positive for high CFU Moraxella is on appropriate antibiotic therapy.  She states she feels much better today than yesterday remains on supplemental oxygen which she does not use at home      No past medical history on file.      Past Surgical History:   Procedure Laterality Date    HIP SURGERY Right 8/9/2024    RIGHT HIP PINNING performed by Robby Olson MD at [facility] OR       Family Hx  family history is not on file.    Social Hx   reports that she has been smoking cigarettes. She started smoking about 71 years ago. She has a 71.3 pack-year smoking history. She has never used smokeless tobacco.    Scheduled Meds:   guaiFENesin  600 mg Oral BID    cefTRIAXone (ROCEPHIN) IV  1,000 mg IntraVENous Q24H    azithromycin  500 mg IntraVENous Q24H    ipratropium 0.5 mg-albuterol 2.5 mg  1 Dose Inhalation Q4H WA RT    sodium chloride flush  5-40 mL IntraVENous 2 times per day    nicotine  1 patch TransDERmal Daily    methylPREDNISolone  40 mg IntraVENous Daily       Continuous Infusions:   sodium chloride         PRN Meds:  guaiFENesin-dextromethorphan, albuterol sulfate HFA, sodium chloride flush, sodium chloride, potassium chloride **OR** potassium alternative oral replacement **OR** potassium chloride, magnesium sulfate, ondansetron **OR** ondansetron,

## 2025-04-14 NOTE — CONSULTS
Reason for Consult: hematuria    History of Present Illness: Altagracia Gottlieb is a 89 y.o. female PMH of copd, continued tobacco abuse.     Presented to ER yesterday with 2 day history of cough, increased SOB. Required 2L NC in ER for hypoxia. Admitted for COPD exacerbation ad PNA. Urology was consulted for possible gross hematuria. Per chart notes from yesterday, patient went to restroom and had bright red blood in toilet, unsure of it was GI or . She has not had any urine studies or  imaging this admission. Hgb is stable at 10.9. She is HDS and afebrile.    She has smoked for 71 years. She is not on anticoagulation. Patient reports blood in her urine for the past 2 days which is new for her. Denies dysuria, changes in urination, fevers. She Does have kidney stone history, unknown when this was. She has never required surgical intervention for stone in the past.     Past Medical History:   No past medical history on file.    Past Surgical History:  Past Surgical History:   Procedure Laterality Date    HIP SURGERY Right 8/9/2024    RIGHT HIP PINNING performed by Robby Olson MD at [facility] OR       Social History:  Social History     Socioeconomic History    Marital status:      Spouse name: Not on file    Number of children: Not on file    Years of education: Not on file    Highest education level: Not on file   Occupational History    Not on file   Tobacco Use    Smoking status: Every Day     Current packs/day: 1.00     Average packs/day: 1 pack/day for 71.3 years (71.3 ttl pk-yrs)     Types: Cigarettes     Start date: 1954    Smokeless tobacco: Never   Substance and Sexual Activity    Alcohol use: Not Currently    Drug use: Never    Sexual activity: Not on file   Other Topics Concern    Not on file   Social History Narrative    Not on file     Social Drivers of Health     Financial Resource Strain: Not on file   Food Insecurity: No Food Insecurity (4/13/2025)    Hunger Vital Sign     Worried About

## 2025-04-14 NOTE — PLAN OF CARE
Problem: Safety - Adult  Goal: Free from fall injury  4/14/2025 0956 by Geni French RN  Flowsheets (Taken 4/14/2025 0947)  Free From Fall Injury: Instruct family/caregiver on patient safety     Problem: Skin/Tissue Integrity  Goal: Skin integrity remains intact  Description: 1.  Monitor for areas of redness and/or skin breakdown2.  Assess vascular access sites hourly3.  Every 4-6 hours minimum:  Change oxygen saturation probe site4.  Every 4-6 hours:  If on nasal continuous positive airway pressure, respiratory therapy assess nares and determine need for appliance change or resting period  Recent Flowsheet Documentation  Taken 4/14/2025 0947 by Geni French RN  Skin Integrity Remains Intact: Monitor for areas of redness and/or skin breakdown  Taken 4/14/2025 0905 by Geni French RN  Skin Integrity Remains Intact: Monitor for areas of redness and/or skin breakdown     Problem: Chronic Conditions and Co-morbidities  Goal: Patient's chronic conditions and co-morbidity symptoms are monitored and maintained or improved  4/14/2025 0956 by Geni French RN  Flowsheets (Taken 4/14/2025 0905)  Care Plan - Patient's Chronic Conditions and Co-Morbidity Symptoms are Monitored and Maintained or Improved: Monitor and assess patient's chronic conditions and comorbid symptoms for stability, deterioration, or improvement     Problem: Discharge Planning  Goal: Discharge to home or other facility with appropriate resources  Flowsheets (Taken 4/14/2025 0905)  Discharge to home or other facility with appropriate resources: Identify barriers to discharge with patient and caregiver     Problem: ABCDS Injury Assessment  Goal: Absence of physical injury  Recent Flowsheet Documentation  Taken 4/14/2025 0947 by Geni French RN  Absence of Physical Injury: Implement safety measures based on patient assessment

## 2025-04-14 NOTE — PLAN OF CARE
Problem: Safety - Adult  Goal: Free from fall injury  Outcome: Progressing  Flowsheets (Taken 4/13/2025 2312)  Free From Fall Injury: Instruct family/caregiver on patient safety     Problem: Skin/Tissue Integrity  Goal: Skin integrity remains intact  Description: 1.  Monitor for areas of redness and/or skin breakdown2.  Assess vascular access sites hourly3.  Every 4-6 hours minimum:  Change oxygen saturation probe site4.  Every 4-6 hours:  If on nasal continuous positive airway pressure, respiratory therapy assess nares and determine need for appliance change or resting period  Outcome: Progressing  Flowsheets (Taken 4/13/2025 2312)  Skin Integrity Remains Intact: Monitor for areas of redness and/or skin breakdown     Problem: Chronic Conditions and Co-morbidities  Goal: Patient's chronic conditions and co-morbidity symptoms are monitored and maintained or improved  Outcome: Progressing

## 2025-04-15 PROBLEM — J44.1 COPD EXACERBATION (HCC): Status: ACTIVE | Noted: 2025-04-15

## 2025-04-15 PROBLEM — J18.9 PNEUMONIA OF RIGHT LOWER LOBE DUE TO INFECTIOUS ORGANISM: Status: ACTIVE | Noted: 2025-04-15

## 2025-04-15 LAB
BACTERIA UR CULT: NORMAL
BASOPHILS # BLD: 0 K/UL (ref 0–0.2)
BASOPHILS NFR BLD: 0.1 %
DEPRECATED RDW RBC AUTO: 18.3 % (ref 12.4–15.4)
EOSINOPHIL # BLD: 0 K/UL (ref 0–0.6)
EOSINOPHIL NFR BLD: 0 %
HCT VFR BLD AUTO: 36.6 % (ref 36–48)
HGB BLD-MCNC: 12 G/DL (ref 12–16)
LYMPHOCYTES # BLD: 1.5 K/UL (ref 1–5.1)
LYMPHOCYTES NFR BLD: 12.4 %
MCH RBC QN AUTO: 29.1 PG (ref 26–34)
MCHC RBC AUTO-ENTMCNC: 32.8 G/DL (ref 31–36)
MCV RBC AUTO: 88.8 FL (ref 80–100)
MONOCYTES # BLD: 0.8 K/UL (ref 0–1.3)
MONOCYTES NFR BLD: 6.4 %
NEUTROPHILS # BLD: 9.9 K/UL (ref 1.7–7.7)
NEUTROPHILS NFR BLD: 81.1 %
PLATELET # BLD AUTO: 245 K/UL (ref 135–450)
PMV BLD AUTO: 8.3 FL (ref 5–10.5)
RBC # BLD AUTO: 4.12 M/UL (ref 4–5.2)
WBC # BLD AUTO: 12.2 K/UL (ref 4–11)

## 2025-04-15 PROCEDURE — 36415 COLL VENOUS BLD VENIPUNCTURE: CPT

## 2025-04-15 PROCEDURE — 6370000000 HC RX 637 (ALT 250 FOR IP)

## 2025-04-15 PROCEDURE — 51798 US URINE CAPACITY MEASURE: CPT

## 2025-04-15 PROCEDURE — 99232 SBSQ HOSP IP/OBS MODERATE 35: CPT | Performed by: NURSE PRACTITIONER

## 2025-04-15 PROCEDURE — 6360000002 HC RX W HCPCS: Performed by: HOSPITALIST

## 2025-04-15 PROCEDURE — 92610 EVALUATE SWALLOWING FUNCTION: CPT

## 2025-04-15 PROCEDURE — 97530 THERAPEUTIC ACTIVITIES: CPT

## 2025-04-15 PROCEDURE — 97162 PT EVAL MOD COMPLEX 30 MIN: CPT

## 2025-04-15 PROCEDURE — 94760 N-INVAS EAR/PLS OXIMETRY 1: CPT

## 2025-04-15 PROCEDURE — 6370000000 HC RX 637 (ALT 250 FOR IP): Performed by: HOSPITALIST

## 2025-04-15 PROCEDURE — 51701 INSERT BLADDER CATHETER: CPT

## 2025-04-15 PROCEDURE — 97535 SELF CARE MNGMENT TRAINING: CPT

## 2025-04-15 PROCEDURE — 6370000000 HC RX 637 (ALT 250 FOR IP): Performed by: INTERNAL MEDICINE

## 2025-04-15 PROCEDURE — 85025 COMPLETE CBC W/AUTO DIFF WBC: CPT

## 2025-04-15 PROCEDURE — 6370000000 HC RX 637 (ALT 250 FOR IP): Performed by: NURSE PRACTITIONER

## 2025-04-15 PROCEDURE — 2500000003 HC RX 250 WO HCPCS: Performed by: HOSPITALIST

## 2025-04-15 PROCEDURE — 1200000000 HC SEMI PRIVATE

## 2025-04-15 PROCEDURE — 2700000000 HC OXYGEN THERAPY PER DAY

## 2025-04-15 PROCEDURE — 94640 AIRWAY INHALATION TREATMENT: CPT

## 2025-04-15 RX ORDER — TAMSULOSIN HYDROCHLORIDE 0.4 MG/1
0.4 CAPSULE ORAL DAILY
Status: DISCONTINUED | OUTPATIENT
Start: 2025-04-15 | End: 2025-04-16 | Stop reason: HOSPADM

## 2025-04-15 RX ORDER — AZITHROMYCIN 500 MG/1
500 TABLET, FILM COATED ORAL NIGHTLY
Status: DISCONTINUED | OUTPATIENT
Start: 2025-04-15 | End: 2025-04-16 | Stop reason: HOSPADM

## 2025-04-15 RX ADMIN — GUAIFENESIN 600 MG: 600 TABLET ORAL at 20:53

## 2025-04-15 RX ADMIN — GUAIFENESIN 600 MG: 600 TABLET ORAL at 08:46

## 2025-04-15 RX ADMIN — ALBUTEROL SULFATE 2 PUFF: 90 AEROSOL, METERED RESPIRATORY (INHALATION) at 04:26

## 2025-04-15 RX ADMIN — WATER 1000 MG: 1 INJECTION INTRAMUSCULAR; INTRAVENOUS; SUBCUTANEOUS at 22:50

## 2025-04-15 RX ADMIN — IPRATROPIUM BROMIDE AND ALBUTEROL SULFATE 1 DOSE: .5; 3 SOLUTION RESPIRATORY (INHALATION) at 15:49

## 2025-04-15 RX ADMIN — IPRATROPIUM BROMIDE AND ALBUTEROL SULFATE 1 DOSE: .5; 3 SOLUTION RESPIRATORY (INHALATION) at 07:42

## 2025-04-15 RX ADMIN — IPRATROPIUM BROMIDE AND ALBUTEROL SULFATE 1 DOSE: .5; 3 SOLUTION RESPIRATORY (INHALATION) at 19:20

## 2025-04-15 RX ADMIN — TIOTROPIUM BROMIDE AND OLODATEROL 2 PUFF: 3.124; 2.736 SPRAY, METERED RESPIRATORY (INHALATION) at 07:42

## 2025-04-15 RX ADMIN — METHYLPREDNISOLONE SODIUM SUCCINATE 40 MG: 125 INJECTION INTRAMUSCULAR; INTRAVENOUS at 08:46

## 2025-04-15 RX ADMIN — SODIUM CHLORIDE, PRESERVATIVE FREE 10 ML: 5 INJECTION INTRAVENOUS at 20:53

## 2025-04-15 RX ADMIN — SODIUM CHLORIDE, PRESERVATIVE FREE 10 ML: 5 INJECTION INTRAVENOUS at 08:47

## 2025-04-15 RX ADMIN — AZITHROMYCIN 500 MG: 500 TABLET, FILM COATED ORAL at 20:53

## 2025-04-15 RX ADMIN — IPRATROPIUM BROMIDE AND ALBUTEROL SULFATE 1 DOSE: .5; 3 SOLUTION RESPIRATORY (INHALATION) at 11:52

## 2025-04-15 RX ADMIN — TAMSULOSIN HYDROCHLORIDE 0.4 MG: 0.4 CAPSULE ORAL at 15:26

## 2025-04-15 NOTE — PLAN OF CARE
Problem: Discharge Planning  Goal: Discharge to home or other facility with appropriate resources  4/15/2025 0811 by Julia Donato RN  Outcome: Progressing  Flowsheets (Taken 4/15/2025 0811)  Discharge to home or other facility with appropriate resources:   Identify barriers to discharge with patient and caregiver   Identify discharge learning needs (meds, wound care, etc)   Refer to discharge planning if patient needs post-hospital services based on physician order or complex needs related to functional status, cognitive ability or social support system   Arrange for needed discharge resources and transportation as appropriate  4/15/2025 0037 by Dana Nelson RN  Outcome: Progressing  Flowsheets (Taken 4/14/2025 2100)  Discharge to home or other facility with appropriate resources: Identify barriers to discharge with patient and caregiver     Problem: Safety - Adult  Goal: Free from fall injury  4/15/2025 0811 by Julia Donato RN  Outcome: Progressing  Flowsheets (Taken 4/15/2025 0811)  Free From Fall Injury: Instruct family/caregiver on patient safety  4/15/2025 0037 by Dana Nelson RN  Outcome: Progressing  Flowsheets (Taken 4/15/2025 0033)  Free From Fall Injury: Instruct family/caregiver on patient safety     Problem: Skin/Tissue Integrity  Goal: Skin integrity remains intact  Description: 1.  Monitor for areas of redness and/or skin breakdown2.  Assess vascular access sites hourly3.  Every 4-6 hours minimum:  Change oxygen saturation probe site4.  Every 4-6 hours:  If on nasal continuous positive airway pressure, respiratory therapy assess nares and determine need for appliance change or resting period  4/15/2025 0811 by Julia Donato RN  Outcome: Progressing  Flowsheets (Taken 4/15/2025 0811)  Skin Integrity Remains Intact:   Monitor for areas of redness and/or skin breakdown   Turn and reposition as indicated   Assess need for specialty bed   Positioning devices   Pressure

## 2025-04-15 NOTE — CARE COORDINATION
04/15/25 1131   Service Assessment   Patient Orientation Alert and Oriented;Person;Place;Situation   Cognition Alert   History Provided By Patient   Primary Caregiver Self   Accompanied By/Relationship no one   Support Systems Children;Family Members   Patient's Healthcare Decision Maker is: Legal Next of Kin   PCP Verified by CM No   Prior Functional Level Independent in ADLs/IADLs   Current Functional Level Assistance with the following:;Mobility   Can patient return to prior living arrangement Yes   Ability to make needs known: Good   Family able to assist with home care needs: Yes   Would you like for me to discuss the discharge plan with any other family members/significant others, and if so, who? Yes  (daughter Omayra)   Financial Resources Medicare  ([employer] Medicare)   Community Resources None   CM/SW Referral Other (see comment)  (dc needs)   Discharge Planning   Type of Residence House   Living Arrangements Children   Current Services Prior To Admission Durable Medical Equipment   Current DME Prior to Arrival Walker;Other (Comment)  ([id number]- grab bars in shower)   Potential Assistance Needed Home Care   DME Ordered? No   Potential Assistance Purchasing Medications No   Type of Home Care Services None   Patient expects to be discharged to: House   Services At/After Discharge   [facility] Resource Information Provided? No   Mode of Transport at Discharge Other (see comment)  (car)   Condition of Participation: Discharge Planning   The Plan for Transition of Care is related to the following treatment goals: home care   The Patient and/or Patient Representative was provided with a Choice of Provider? Patient   The Patient and/Or Patient Representative agree with the Discharge Plan? Yes   Freedom of Choice list was provided with basic dialogue that supports the patient's individualized plan of care/goals, treatment preferences, and shares the quality data associated with the providers?  Yes     Case Management

## 2025-04-15 NOTE — PLAN OF CARE
Problem: Discharge Planning  Goal: Discharge to home or other facility with appropriate resources  Outcome: Progressing  Flowsheets (Taken 4/14/2025 2100)  Discharge to home or other facility with appropriate resources: Identify barriers to discharge with patient and caregiver     Problem: Safety - Adult  Goal: Free from fall injury  Outcome: Progressing  Flowsheets (Taken 4/15/2025 0033)  Free From Fall Injury: Instruct family/caregiver on patient safety     Problem: Skin/Tissue Integrity  Goal: Skin integrity remains intact  Description: 1.  Monitor for areas of redness and/or skin breakdown2.  Assess vascular access sites hourly3.  Every 4-6 hours minimum:  Change oxygen saturation probe site4.  Every 4-6 hours:  If on nasal continuous positive airway pressure, respiratory therapy assess nares and determine need for appliance change or resting period  Outcome: Progressing  Flowsheets  Taken 4/15/2025 0037  Skin Integrity Remains Intact: Monitor for areas of redness and/or skin breakdown  Taken 4/15/2025 0033  Skin Integrity Remains Intact: Monitor for areas of redness and/or skin breakdown  Taken 4/14/2025 2100  Skin Integrity Remains Intact: Monitor for areas of redness and/or skin breakdown     Problem: Chronic Conditions and Co-morbidities  Goal: Patient's chronic conditions and co-morbidity symptoms are monitored and maintained or improved  Outcome: Progressing  Flowsheets (Taken 4/14/2025 2100)  Care Plan - Patient's Chronic Conditions and Co-Morbidity Symptoms are Monitored and Maintained or Improved: Monitor and assess patient's chronic conditions and comorbid symptoms for stability, deterioration, or improvement     Problem: HH BREATHING PROBLEMS-COPD  Goal: Respiratory status at baseline  Description: The patient will maintain respiratory baselines as evidenced by return to optimal level of breathing within disease parameters within 6 day(s).  Outcome: Progressing     Problem: HH PROBLEMS WITH

## 2025-04-15 NOTE — ACP (ADVANCE CARE PLANNING)
Advance Care Planning   Healthcare Decision Maker:    Primary Decision Maker: GeorgieashOmayra - Child - [phone]    Electronically signed by Elida Curran on 4/15/2025 at 11:29 AM  #[phone]

## 2025-04-16 VITALS
DIASTOLIC BLOOD PRESSURE: 74 MMHG | HEART RATE: 78 BPM | SYSTOLIC BLOOD PRESSURE: 106 MMHG | WEIGHT: 93.7 LBS | BODY MASS INDEX: 17.24 KG/M2 | TEMPERATURE: 98 F | RESPIRATION RATE: 14 BRPM | HEIGHT: 62 IN | OXYGEN SATURATION: 98 %

## 2025-04-16 PROCEDURE — 2500000003 HC RX 250 WO HCPCS: Performed by: HOSPITALIST

## 2025-04-16 PROCEDURE — 6360000002 HC RX W HCPCS: Performed by: HOSPITALIST

## 2025-04-16 PROCEDURE — 94680 O2 UPTK RST&XERS DIR SIMPLE: CPT

## 2025-04-16 PROCEDURE — 94660 CPAP INITIATION&MGMT: CPT

## 2025-04-16 PROCEDURE — 6370000000 HC RX 637 (ALT 250 FOR IP): Performed by: INTERNAL MEDICINE

## 2025-04-16 PROCEDURE — 97530 THERAPEUTIC ACTIVITIES: CPT

## 2025-04-16 PROCEDURE — 5A09357 ASSISTANCE WITH RESPIRATORY VENTILATION, LESS THAN 24 CONSECUTIVE HOURS, CONTINUOUS POSITIVE AIRWAY PRESSURE: ICD-10-PCS | Performed by: HOSPITALIST

## 2025-04-16 PROCEDURE — 97535 SELF CARE MNGMENT TRAINING: CPT

## 2025-04-16 PROCEDURE — 6370000000 HC RX 637 (ALT 250 FOR IP): Performed by: HOSPITALIST

## 2025-04-16 PROCEDURE — 97116 GAIT TRAINING THERAPY: CPT

## 2025-04-16 PROCEDURE — 6370000000 HC RX 637 (ALT 250 FOR IP)

## 2025-04-16 PROCEDURE — 99233 SBSQ HOSP IP/OBS HIGH 50: CPT | Performed by: INTERNAL MEDICINE

## 2025-04-16 PROCEDURE — 6370000000 HC RX 637 (ALT 250 FOR IP): Performed by: NURSE PRACTITIONER

## 2025-04-16 PROCEDURE — 2700000000 HC OXYGEN THERAPY PER DAY

## 2025-04-16 PROCEDURE — 94640 AIRWAY INHALATION TREATMENT: CPT

## 2025-04-16 PROCEDURE — 94761 N-INVAS EAR/PLS OXIMETRY MLT: CPT

## 2025-04-16 RX ORDER — GUAIFENESIN/DEXTROMETHORPHAN 100-10MG/5
5 SYRUP ORAL 3 TIMES DAILY PRN
Qty: 120 ML | Refills: 0 | Status: SHIPPED | OUTPATIENT
Start: 2025-04-16 | End: 2025-04-26

## 2025-04-16 RX ORDER — PREDNISONE 20 MG/1
40 TABLET ORAL DAILY
Qty: 6 TABLET | Refills: 0 | Status: SHIPPED | OUTPATIENT
Start: 2025-04-16 | End: 2025-04-19

## 2025-04-16 RX ORDER — TAMSULOSIN HYDROCHLORIDE 0.4 MG/1
0.4 CAPSULE ORAL DAILY
Qty: 30 CAPSULE | Refills: 3 | Status: SHIPPED | OUTPATIENT
Start: 2025-04-16

## 2025-04-16 RX ADMIN — TAMSULOSIN HYDROCHLORIDE 0.4 MG: 0.4 CAPSULE ORAL at 10:36

## 2025-04-16 RX ADMIN — IPRATROPIUM BROMIDE AND ALBUTEROL SULFATE 1 DOSE: .5; 3 SOLUTION RESPIRATORY (INHALATION) at 16:38

## 2025-04-16 RX ADMIN — GUAIFENESIN 600 MG: 600 TABLET ORAL at 10:36

## 2025-04-16 RX ADMIN — IPRATROPIUM BROMIDE AND ALBUTEROL SULFATE 1 DOSE: .5; 3 SOLUTION RESPIRATORY (INHALATION) at 11:52

## 2025-04-16 RX ADMIN — IPRATROPIUM BROMIDE AND ALBUTEROL SULFATE 1 DOSE: .5; 3 SOLUTION RESPIRATORY (INHALATION) at 07:47

## 2025-04-16 RX ADMIN — TIOTROPIUM BROMIDE AND OLODATEROL 2 PUFF: 3.124; 2.736 SPRAY, METERED RESPIRATORY (INHALATION) at 07:47

## 2025-04-16 RX ADMIN — SODIUM CHLORIDE, PRESERVATIVE FREE 10 ML: 5 INJECTION INTRAVENOUS at 10:40

## 2025-04-16 RX ADMIN — METHYLPREDNISOLONE SODIUM SUCCINATE 40 MG: 125 INJECTION INTRAMUSCULAR; INTRAVENOUS at 10:36

## 2025-04-16 NOTE — PLAN OF CARE
Problem: Discharge Planning  Goal: Discharge to home or other facility with appropriate resources  4/16/2025 1114 by Dinorah Acosta RN  Outcome: Progressing  Flowsheets (Taken 4/16/2025 1114)  Discharge to home or other facility with appropriate resources:   Identify discharge learning needs (meds, wound care, etc)   Identify barriers to discharge with patient and caregiver   Arrange for needed discharge resources and transportation as appropriate  4/15/2025 2333 by Jadyn Preciado RN  Outcome: Progressing  Flowsheets  Taken 4/15/2025 2053 by Jadyn Preciado RN  Discharge to home or other facility with appropriate resources:   Identify barriers to discharge with patient and caregiver   Arrange for needed discharge resources and transportation as appropriate   Identify discharge learning needs (meds, wound care, etc)  Taken 4/15/2025 0811 by Julia Donato RN  Discharge to home or other facility with appropriate resources:   Identify barriers to discharge with patient and caregiver   Identify discharge learning needs (meds, wound care, etc)   Refer to discharge planning if patient needs post-hospital services based on physician order or complex needs related to functional status, cognitive ability or social support system   Arrange for needed discharge resources and transportation as appropriate     Problem: Safety - Adult  Goal: Free from fall injury  4/16/2025 1114 by Dinorah Acosta RN  Outcome: Progressing  Flowsheets (Taken 4/16/2025 1114)  Free From Fall Injury: Instruct family/caregiver on patient safety  4/15/2025 2333 by Jadyn Preciado RN  Outcome: Progressing  Flowsheets  Taken 4/15/2025 2333  Free From Fall Injury:   Instruct family/caregiver on patient safety   Based on caregiver fall risk screen, instruct family/caregiver to ask for assistance with transferring infant if caregiver noted to have fall risk factors  Taken 4/15/2025 2331  Free From Fall Injury:   Instruct family/caregiver on patient

## 2025-04-16 NOTE — DISCHARGE SUMMARY
Hospital Medicine Discharge Summary    Patient ID: Altagracia Gottlieb      Patient's PCP: No primary care provider on file.    Admit Date: 4/12/2025     Discharge Date:       Admitting Physician: Anum Lewis Jr., MD     Discharge Physician: Lorri Ann MD     Discharge Diagnoses:       Active Hospital Problems    Diagnosis     Pneumonia of right lower lobe due to infectious organism [J18.9]     COPD exacerbation (HCC) [J44.1]     Acute respiratory failure with hypoxia [J96.01]        The patient was seen and examined on day of discharge and this discharge summary is in conjunction with any daily progress note from day of discharge.    Hospital Course:     89f with history of copd, continued tobacco abuse, presents with 2 day history of cough, increased sob. Denies fever, chills difficulty swallowing solids or liquids, chest pain, palpitations, lightheadedness or dizziness. She denies history of O2 dependence, presents hypoxemic requiring 2lpm NC O2. She is easily awakened by name, is oriented and is coherently and appropriately responsive in no respiratory distress.     COPD exacrbation  : improving     Will check for Home O2 eval  Patient remains on 3 L oxygen  Telemetry monitering  Oxygen  Duonebs   Steroids : IV Solu-Medrol  IV antibiotics : IV rocephin and IV zithromax   Moniter O2 sat  Pulmonology recommendation noted         PNA  Empiric IV antibiotics : IV Rocephin and Zithromax  Duonebs   O2 via NC to keep O2 sat > 90 %  Check PNA panl , Procal  : Pneumonia panel positive for Moraxella catarrhalis  Check MRSA nasal probe  Blood culture x 2     Gross hematuria episode : Per patient there is no further hematuria episode     CT urogram results discussed with patient     IMPRESSION:  1. 4 mm right renal pelvic stone with mild right hydronephrosis and proximal hydroureter.  2. No renal mass or urethral lesion.   3. 2 mm nonobstructing stone in the lower pole of the left kidney.  4. Infrarenal

## 2025-04-16 NOTE — DISCHARGE INSTR - COC
Continuity of Care Form    Patient Name: Altagracia Gottlieb   :  1935  MRN:  [id number]    Admit date:  2025  Discharge date:  ***    Code Status Order: DNR-CC   Advance Directives:     Admitting Physician:  Anum Lewis Jr., MD  PCP: No primary care provider on file.    Discharging Nurse: ***  Discharging Hospital Unit/Room#: A1J-5968/3128-01  Discharging Unit Phone Number: ***    Emergency Contact:   Extended Emergency Contact Information  Primary Emergency Contact: Omayra Cohen  Home Phone: [phone]  Mobile Phone: [phone]  Relation: Child    Past Surgical History:  Past Surgical History:   Procedure Laterality Date    HIP SURGERY Right 2024    RIGHT HIP PINNING performed by Robby Olson MD at [facility] OR       Immunization History:   Immunization History   Administered Date(s) Administered    COVID-19, PFIZER PURPLE top, DILUTE for use, (age 12 y+), 30mcg/0.3mL 2021, 08/15/2021       Active Problems:  Patient Active Problem List   Diagnosis Code    Closed fracture of neck of right femur S72.001A    Closed right hip fracture, initial encounter ([facility]) S72.001A    Current smoker F17.200    Acute respiratory failure with hypoxia J96.01    Pneumonia of right lower lobe due to infectious organism J18.9    COPD exacerbation ([facility]) J44.1       Isolation/Infection:   Isolation            No Isolation          Patient Infection Status    None to display              Nurse Assessment:  Last Vital Signs: BP (!) 152/61   Pulse 56   Temp 98.1 °F (36.7 °C) (Axillary)   Resp 14   Ht 1.575 m (5' 2\")   Wt 42.5 kg (93 lb 11.1 oz)   SpO2 97%   BMI 17.14 kg/m²     Last documented pain score (0-10 scale):    Last Weight:   Wt Readings from Last 1 Encounters:   04/15/25 42.5 kg (93 lb 11.1 oz)     Mental Status:  {IP PT MENTAL STATUS:}    IV Access:  { DEJUAN IV ACCESS:239299052}    Nursing Mobility/ADLs:  Walking   {P DME ADLs:981310684}  Transfer  {P DME ADLs:701758007}  Bathing  {P DME

## 2025-04-16 NOTE — PLAN OF CARE
Problem: Discharge Planning  Goal: Discharge to home or other facility with appropriate resources  Outcome: Progressing  Flowsheets (Taken 4/15/2025 0811 by Julia Donato, RN)  Discharge to home or other facility with appropriate resources:   Identify barriers to discharge with patient and caregiver   Identify discharge learning needs (meds, wound care, etc)   Refer to discharge planning if patient needs post-hospital services based on physician order or complex needs related to functional status, cognitive ability or social support system   Arrange for needed discharge resources and transportation as appropriate     Problem: Safety - Adult  Goal: Free from fall injury  Outcome: Progressing  Flowsheets  Taken 4/15/2025 2333  Free From Fall Injury:   Instruct family/caregiver on patient safety   Based on caregiver fall risk screen, instruct family/caregiver to ask for assistance with transferring infant if caregiver noted to have fall risk factors  Taken 4/15/2025 2331  Free From Fall Injury:   Instruct family/caregiver on patient safety   Based on caregiver fall risk screen, instruct family/caregiver to ask for assistance with transferring infant if caregiver noted to have fall risk factors     Problem: Skin/Tissue Integrity  Goal: Skin integrity remains intact  Description: 1.  Monitor for areas of redness and/or skin breakdown2.  Assess vascular access sites hourly3.  Every 4-6 hours minimum:  Change oxygen saturation probe site4.  Every 4-6 hours:  If on nasal continuous positive airway pressure, respiratory therapy assess nares and determine need for appliance change or resting period  Outcome: Progressing  Flowsheets  Taken 4/15/2025 2333  Skin Integrity Remains Intact: Monitor for areas of redness and/or skin breakdown  Taken 4/15/2025 2331  Skin Integrity Remains Intact: Monitor for areas of redness and/or skin breakdown     Problem: Chronic Conditions and Co-morbidities  Goal: Patient's chronic

## 2025-04-16 NOTE — CARE COORDINATION
DISCHARGE SUMMARY     DATE OF DISCHARGE: 4/16/2025    DISCHARGE DESTINATION: home w/ daughter     HOME CARE: No    TRANSPORTATION: Private Car    NEW DME ORDERED: yes    COMMENTS: home O2@3l continuous per Aerangela     Electronically signed by Elida Curran on 4/16/2025 at 12:26 PM  #[phone]

## 2025-04-16 NOTE — CARE COORDINATION
Home O2 order noted  Spoke w/ patient bedside -she is agreeable t0 home O2  No preference for DME supplier  Also called and spoke w/ daughter Omayra MCCALL #[phone]   She is agreeable to home O2   Referral to [facility] w/ Prashant   Daughter did confirm that pt currently is not active w/ a PCP - she states she will work on securing a PCP for pt    We reviewed Woundology and their assist with transition of care at [facility]. She is agreeable to referral to Woundology  Spoke w/ Jossy w/ [facility] # [phone]- she has accepted apt and will follow at home-aware of DC today     Electronically signed by Elida Curran on 4/16/2025 at 11:13 AM  #[phone]

## 2025-04-17 LAB
BACTERIA BLD CULT ORG #2: NORMAL
BACTERIA BLD CULT: NORMAL

## 2025-04-17 NOTE — PLAN OF CARE
Problem: Discharge Planning  Goal: Discharge to home or other facility with appropriate resources  4/16/2025 2104 by Loli Ellis RN  Outcome: Completed  4/16/2025 1114 by Dinorah Acosta RN  Outcome: Progressing  Flowsheets (Taken 4/16/2025 1114)  Discharge to home or other facility with appropriate resources:   Identify discharge learning needs (meds, wound care, etc)   Identify barriers to discharge with patient and caregiver   Arrange for needed discharge resources and transportation as appropriate     Problem: Safety - Adult  Goal: Free from fall injury  4/16/2025 2104 by Loli Ellis RN  Outcome: Completed  4/16/2025 1114 by Dinorah Acosta RN  Outcome: Progressing  Flowsheets (Taken 4/16/2025 1114)  Free From Fall Injury: Instruct family/caregiver on patient safety     Problem: Skin/Tissue Integrity  Goal: Skin integrity remains intact  Description: 1.  Monitor for areas of redness and/or skin breakdown2.  Assess vascular access sites hourly3.  Every 4-6 hours minimum:  Change oxygen saturation probe site4.  Every 4-6 hours:  If on nasal continuous positive airway pressure, respiratory therapy assess nares and determine need for appliance change or resting period  4/16/2025 2104 by Loli Ellis RN  Outcome: Completed  4/16/2025 1114 by Dinorah Acosta RN  Outcome: Progressing  Flowsheets (Taken 4/16/2025 1114)  Skin Integrity Remains Intact:   Monitor for areas of redness and/or skin breakdown   Assess vascular access sites hourly     Problem: Chronic Conditions and Co-morbidities  Goal: Patient's chronic conditions and co-morbidity symptoms are monitored and maintained or improved  4/16/2025 2104 by Loli Ellis RN  Outcome: Completed  4/16/2025 1114 by Dinorah Acosta RN  Outcome: Progressing  Flowsheets (Taken 4/15/2025 2053 by Jadyn Preciado, RN)  Care Plan - Patient's Chronic Conditions and Co-Morbidity Symptoms are Monitored and Maintained or Improved:   Monitor and assess patient's chronic

## 2025-04-17 NOTE — PROGRESS NOTES
V2.0    [facility] [place]      Name:  Altagracia Gottlieb /Age/Sex: 1935  (89 y.o. female)   MRN & CSN:  [id number] & [id number] Encounter Date/Time: 2025 10:00 AM EDT   Location:  [id number]-7411/3128-01 PCP: No primary care provider on file.     Attending:Lorri Ann MD       Hospital Day: 2    Assessment and Recommendations   Altagracia Gottlieb is a 89 y.o. female with pmh of  who presents with Acute hypoxemic respiratory failure      Plan:         COPD exacrbation    Admit Inpatient  Telemetry monitering  Oxygen  Duonebs   Steroids : IV Solu-Medrol  IV antibiotics  Moniter O2 sat      PNA  Empiric IV antibiotics : IV Rocephin and Zithromax  Duonebs   O2 via NC to keep O2 sat > 90 %  Check PNA panl , Procal  Check MRSA nasal probe  Blood culture x 2      Continue home meds for chronic conditions        Diet ADULT DIET; Regular; Gluten Free   DVT Prophylaxis [] Lovenox, []  Heparin, [] SCDs, [] Ambulation,  [] Eliquis, [] Xarelto  [] Coumadin   Code Status Full Code   Disposition From:   Expected Disposition:   Estimated Date of Discharge:   Patient requires continued admission due to   Surrogate Decision Maker/ POA         Personally reviewed Lab Studies and Imaging           Telemetry reviewed by myself no ST elevation           Drugs that require monitoring for toxicity include steroids and the method of monitoring was blood sugar     Medical Decision Making:  The following items were considered in medical decision making:  Discussion of patient care with other providers  Reviewed clinical lab tests  Reviewed radiology tests  Reviewed other diagnostic tests/interventions  Independent review of radiologic images  Microbiology cultures and other micro tests reviewed            Subjective:     Chief Complaint:     Altagracia Gottlieb is a 89 y.o. female who presents with     Cough +  SOB +    AAO x 3  No acute overnight events  Denies chest pain  Denies fever or chills or vomiting or abdominal pain 
      V2.0    [facility] [place]      Name:  Altagracia Gottlieb /Age/Sex: 1935  (89 y.o. female)   MRN & CSN:  [id number] & [id number] Encounter Date/Time: 2025 10:00 AM EDT   Location:  [id number]-5262/3128-01 PCP: No primary care provider on file.     Attending:Lorri Ann MD       Hospital Day: 3    Assessment and Recommendations   Altagracia Gottlieb is a 89 y.o. female with pmh of  who presents with Acute hypoxemic respiratory failure      Plan:         COPD exacrbation      Patient remains on 3 L oxygen  Telemetry monitering  Oxygen  Duonebs   Steroids : IV Solu-Medrol  IV antibiotics  Moniter O2 sat  Will consult pulmonology      PNA  Empiric IV antibiotics : IV Rocephin and Zithromax  Duonebs   O2 via NC to keep O2 sat > 90 %  Check PNA panl , Procal  : Pneumonia panel positive for Moraxella catarrhalis  Check MRSA nasal probe  Blood culture x 2    Gross hematuria episode :    Avoid anticoagulation or antiplatelet agents for now  Check urine analysis with culture  Urology consult  Monitor hemoglobin    Spent 3-10 minutes counseling patient on smoking cessation, discussing strategies and resources to support the patient in quitting.        Continue home meds for chronic conditions    Took me more than 51 minutes for clinical management, coordination of care, reviewing labs , ordering medication as needed , reviewing consultants notes and recommendation  including plan of care discussion with the patient ,nursing staff and case management and consultants        Diet ADULT DIET; Regular; Gluten Free   DVT Prophylaxis [] Lovenox, []  Heparin, [] SCDs, [] Ambulation,  [] Eliquis, [] Xarelto  [] Coumadin   Code Status DNR-CC   Disposition From:   Expected Disposition:   Estimated Date of Discharge:   Patient requires continued admission due to   Surrogate Decision Maker/ POA         Personally reviewed Lab Studies and Imaging      Discharge planning discussed with the case management in multidisciplinary 
      V2.0    [facility] [place]      Name:  Altagracia Gottlieb /Age/Sex: 1935  (89 y.o. female)   MRN & CSN:  [id number] & [id number] Encounter Date/Time: 4/15/2025 10:00 AM EDT   Location:  [id number]-9772/3128-01 PCP: No primary care provider on file.     Attending:Lorri Ann MD       Hospital Day: 4    Assessment and Recommendations   Altagracia Gottlieb is a 89 y.o. female with pmh of  who presents with Acute hypoxemic respiratory failure      Plan:         COPD exacrbation  : improving    Will check for Home O2 eval  Patient remains on 3 L oxygen  Telemetry monitering  Oxygen  Duonebs   Steroids : IV Solu-Medrol  IV antibiotics : IV rocephin and IV zithromax   Moniter O2 sat  Pulmonology recommendation noted       PNA  Empiric IV antibiotics : IV Rocephin and Zithromax  Duonebs   O2 via NC to keep O2 sat > 90 %  Check PNA panl , Procal  : Pneumonia panel positive for Moraxella catarrhalis  Check MRSA nasal probe  Blood culture x 2    Gross hematuria episode : Per patient there is no further hematuria episode    CT urogram results discussed with patient    IMPRESSION:  1. 4 mm right renal pelvic stone with mild right hydronephrosis and proximal hydroureter.  2. No renal mass or urethral lesion.   3. 2 mm nonobstructing stone in the lower pole of the left kidney.  4. Infrarenal abdominal aortic aneurysm measuring approximately 3.3 cm in diameter with crescentic mural thrombus. Recommend follow up every 3 y   5. Small-to-moderate right and small left partially loculated pleural effusions.  6. Emphysematous changes noted on both lung bases with chronic fibrosis and interstitial pulmonary edema.  7. Stool throughout the colon suggesting constipation.    Urology discussed treatment option with the patient and for now plan is to treat conservatively      Avoid anticoagulation or antiplatelet agents for now  Check urine analysis with culture  Urology consult  Monitor hemoglobin    Spent 3-10 minutes counseling 
    Patient was evaluated today for the diagnosis of COPD.  I entered a DME order for home oxygen at 3 lpm because the diagnosis and testing require the patient to have supplemental oxygen.  Condition will improve or be benefited by oxygen use.  The patient is not able to perform good mobility in a home setting and therefore does require the use of a portable oxygen system.  The need for this equipment was discussed with the patient and she understands and is in agreement.    
    Pt Name: Altagracia Gottlieb  Medical Record Number: [id number]  Date of Birth 1935   Today's Date: 4/15/2025      Subjective:  NAEON. Patient denies flank pain, dysuria. She has not seen blood in her urine today.     ROS: Constitutional: No fever    Vitals:  Vitals:    04/15/25 0130 04/15/25 0345 04/15/25 0426 04/15/25 0740   BP:  (!) 147/63     Pulse:  73 85    Resp:  14 20    Temp:  97.6 °F (36.4 °C)     TempSrc:  Oral     SpO2:  98% 90% 95%   Weight: 42.5 kg (93 lb 11.1 oz)      Height:         I/O last 3 completed shifts:  In: 2348 [P.O.:2340; I.V.:8]  Out: 1 [Urine:1]    Exam:  General: Awake, oriented, no acute distress  Respiratory: Nonlabored breathing  Abdomen: Soft, non-tender, non-distended, no masses  : spontaneously voids      CURRENT MEDICATIONS   Scheduled Meds:   guaiFENesin  600 mg Oral BID    tiotropium-olodaterol  2 puff Inhalation Daily RT    cefTRIAXone (ROCEPHIN) IV  1,000 mg IntraVENous Q24H    azithromycin  500 mg IntraVENous Q24H    ipratropium 0.5 mg-albuterol 2.5 mg  1 Dose Inhalation Q4H WA RT    sodium chloride flush  5-40 mL IntraVENous 2 times per day    nicotine  1 patch TransDERmal Daily    methylPREDNISolone  40 mg IntraVENous Daily     Continuous Infusions:   sodium chloride 250 mL (04/14/25 2113)     PRN Meds:.guaiFENesin-dextromethorphan, albuterol sulfate HFA, sodium chloride flush, sodium chloride, potassium chloride **OR** potassium alternative oral replacement **OR** potassium chloride, magnesium sulfate, ondansetron **OR** ondansetron, polyethylene glycol, acetaminophen **OR** acetaminophen    LABS     Recent Labs     04/12/25  1945 04/14/25  0440   WBC 12.1* 13.2*   HGB 12.5 10.9*   HCT 37.9 32.7*    210    138   K 4.2 4.4    105   CO2 26 22   BUN 29* 26*   CREATININE 0.7 0.6   MG 2.16  --    CALCIUM 9.4 8.8   AST 24  --    ALT 10  --    BILITOT 0.3  --      CTU 4/14/25  IMPRESSION:  1. 4 mm right renal pelvic stone with mild right hydronephrosis 
   04/15/25 1225   Resting (Room Air)   SpO2 84   HR 90   Resting (On O2)   SpO2 93   HR 67   O2 Device Nasal cannula   O2 Flow Rate (l/min) 3 l/min   During Walk (Room Air)   Comments Pt refused walking at this time. Social Work informed.   During Walk (On O2)   Comments Pt refused walking at this time. Social work informed.       
  +  [facility] - Occupational Therapy   Phone: [phone]    Occupational Therapy  Facility/Department:[address] ORTHOPEDICS    [] Initial Evaluation            [x] Daily Treatment Note         [] Discharge Summary      Patient: Altagracia Gottlieb   : 1935   MRN: [id number]   Date of Service:  2025    Staff Mobility Recommendation: RW x1    AM-PAC Score: 19/24  Altagracia Gottlieb scored a 19/24 on the AM-PAC ADL Inpatient form. Current research shows that an AM-PAC score of 18 or greater is typically associated with a discharge to the patient's home setting. Based on the patient's AM-PAC score, and their current ADL deficits, it is recommended that the patient have 2-3 sessions per week of Occupational Therapy at d/c to increase the patient's independence.  At this time, this patient demonstrates the endurance and safety to discharge home with HHOT and a follow up treatment frequency of 2-3x/wk.   Please see assessment section for further patient specific details.    If patient discharges prior to next session this note will serve as a discharge summary.  Please see below for the latest assessment towards goals.     Discharge Recommendations: HHOT    Admitting Diagnosis:  Acute respiratory failure with hypoxia  Ordering Physician: Lorri Ann MD   Current Admission Summary: per ED note, Altagracia Gottlieb is a 89 y.o. female with past medical history of sleep apnea COPD coronary arteriosclerosis, nicotine dependence, hyperlipidemia who presents for evaluation of shortness of breath with a green productive cough.  Her symptoms started about 2 days ago.  She continues to smoke tobacco.  She denies significant chest pain.  Denies significant dyspnea just progressively worsening over the last 2 days with this productive cough.  She has no other acute concerns or complaints at this time.  She tried albuterol and it helped a little bit.     Past Medical History:  has no past medical history on file.  Past 
  [facility] - Physical Therapy   Phone: [phone]    Physical Therapy  Facility/Department:[address] ORTHOPEDICS    [] Initial Evaluation            [x] Daily Treatment Note         [] Discharge Summary      Patient: Altagracia Gottlieb   : 1935   MRN: [id number]   Date of Service:  2025  Staff Mobility Recommendation: rolling walker     AM-PAC score:     Discharge Recommendations: home with family assist and Home PT OT       Clinical Assessment:   -to room to telesitter alarm sounding  -she is wanting to get out of bed to get to the bathroom   -oriented to self and situation     -functioning close to same as yesterday  - has O2 on and respiratory therapy in to assess for Home O2 needs      -at this time recommend she have 24 hour assist and supervision as may need to use O2 at home         Admitting Diagnosis: Acute respiratory failure with hypoxia  Ordering Physician: Seth  Current Admission Summary: here due to SOB     Past Medical History:  has no past medical history on file.  Past Surgical History:  has a past surgical history that includes hip surgery (Right, 2024).    Assessment  Activity Tolerance: Good  Impairments Requiring Therapeutic Intervention: decreased functional mobility, decreased endurance  Prognosis: good        DME Required For Discharge: no DME required at discharge    _______________________________________________________________________________________________________________________________________  Restrictions:  Precautions/Restrictions:  2L of O2 via nasal cannula  Weight Bearing Restrictions: no restrictions    Required Braces/Orthotics: no braces required  Positional Restrictions:no positional restrictions      Subjective  General: to room to alarm sounding - agreeable to PT session starting in this context   Family/Caregiver present: No  Pain: Patient reports no pain  Pain Interventions: not applicable    Home Environment / Functional History  Lives With: 
  [facility] - Physical Therapy   Phone: [phone]    Physical Therapy  Facility/Department:[address] ORTHOPEDICS    [x] Initial Evaluation            [] Daily Treatment Note         [] Discharge Summary      Patient: Altagracia Gottlieb   : 1935   MRN: [id number]   Date of Service:  4/15/2025  Staff Mobility Recommendation: rolling walker     AM-PAC score:   Discharge Recommendations: home with family assist     Admitting Diagnosis: Acute hypoxemic respiratory failure  Ordering Physician: Seth  Current Admission Summary: here due to SOB     Past Medical History:  has no past medical history on file.  Past Surgical History:  has a past surgical history that includes hip surgery (Right, 2024).    Assessment  Activity Tolerance: Good  Impairments Requiring Therapeutic Intervention: decreased functional mobility, decreased endurance  Prognosis: good    Clinical Assessment: engaged well for PT assessment and up to ambulate in room to demonstrate her mobility using rolling walker       DME Required For Discharge: no DME required at discharge    _______________________________________________________________________________________________________________________________________  Restrictions:  Precautions/Restrictions:  2L of O2 via nasal cannula  Weight Bearing Restrictions: no restrictions    Required Braces/Orthotics: no braces required  Positional Restrictions:no positional restrictions      Subjective  General: to room to patient out of bed in recliner, breakfast completed  - agreeable to PT assessment in tandem of OT session   Family/Caregiver present: No  Pain: Patient reports no pain  Pain Interventions: not applicable    Home Environment / Functional History  Lives With: Daughter (daughter and son in law)  Type of Home: House  Home Layout: Two level, 1/2 bath on main level, Laundry in basement (pt reports able to stay on main floor as needed)  Home Access: Stairs to enter without 
  Physician Progress Note      PATIENT:               OUSMANE MCKENNA  CSN #:                  [id number]  :                       1935  ADMIT DATE:       2025 6:50 PM  DISCH DATE:  RESPONDING  PROVIDER #:        Lorri Ann MD          QUERY TEXT:    Pneumonia is documented in the medical record. Patient noted to have wbc 12.1,   lactic acid 6.4, and pulse 110.  Please clarify any associated diagnoses:    The clinical indicators include:  Wbc 12.1-13.2, lactic acid 6.4, acute respiratory failure, pulse 110, RR 25.  Moraxella Pneumonia  smoker    Treatment: IV Zithromax, IV Rocephin, Blood cultures, respiratory cultures,   serial labs, HHN, supportive care  Options provided:  -- Sepsis due to pneumonia, present on arrival  -- Localized infection without sepsis  -- Other - I will add my own diagnosis  -- Disagree - Not applicable / Not valid  -- Disagree - Clinically unable to determine / Unknown  -- Refer to Clinical Documentation Reviewer    PROVIDER RESPONSE TEXT:    This patient has sepsis due to pneumonia which was present on arrival.    Query created by: Jordana Sebastian on 4/15/2025 1:47 PM      Electronically signed by:  Lorri Ann MD 2025 9:16 AM          
  Physician Progress Note      PATIENT:               OUSMANE MCKENNA  CSN #:                  [id number]  :                       1935  ADMIT DATE:       2025 6:50 PM  DISCH DATE:  RESPONDING  PROVIDER #:        Lorri Ann MD          QUERY TEXT:    Acute respiratory failure is documented in the medical record. No respiratory   distress noted on ED consult note and H&P.  Please provide additional clinical   indicators supportive of your documentation. Or please document if the   diagnosis of acute respiratory failure has been ruled out after study.    The clinical indicators include:  Per H&P \" Effort: Pulmonary effort is normal. No respiratory distress. Breath   sounds: No wheezing, rhonchi or rales\". RR up to 25, spo2 down to 89%,   supplemental oxygen up to 3L  COPD  Pneumonia  Smoker    Treatment: Supplemental oxygen, IV antibiotics, serial labs, supportive care  Options provided:  -- Acute Respiratory Failure as evidenced by, Please document evidence.  -- Acute Respiratory Failure ruled out after study  -- Other - I will add my own diagnosis  -- Disagree - Not applicable / Not valid  -- Disagree - Clinically unable to determine / Unknown  -- Refer to Clinical Documentation Reviewer    PROVIDER RESPONSE TEXT:    This patient is in acute respiratory failure as evidenced by beed for 3 L   oxygen to mainaint O2 sat > 90 %    Query created by: Jordana Sebastian on 2025 11:05 AM      Electronically signed by:  Lorri Ann MD 4/15/2025 12:18 PM          
  [facility] - Occupational Therapy   Phone: [phone]    Occupational Therapy  Facility/Department:[address] ORTHOPEDICS    [x] Initial Evaluation            [x] Daily Treatment Note         [] Discharge Summary      Patient: Altagracia Gottlieb   : 1935   MRN: [id number]   Date of Service:  2025    Staff Mobility Recommendation: RW x1    AM-PAC Score: 17/24  Altagracia Gottlieb scored a 17/24 on the AM-PAC ADL Inpatient form. Current research shows that an AM-PAC score of 18 or greater is typically associated with a discharge to the patient's home setting. Based on the patient's AM-PAC score, and their current ADL deficits, it is recommended that the patient have 2-3 sessions per week of Occupational Therapy at d/c to increase the patient's independence.  At this time, this patient demonstrates the endurance and safety to discharge home with HHOT and a follow up treatment frequency of 2-3x/wk.   Please see assessment section for further patient specific details.    If patient discharges prior to next session this note will serve as a discharge summary.  Please see below for the latest assessment towards goals.     Discharge Recommendations: HHOT    Admitting Diagnosis:  Acute hypoxemic respiratory failure  Ordering Physician: Lorri Ann MD   Current Admission Summary: per ED note, Altagracia Gottlieb is a 89 y.o. female with past medical history of sleep apnea COPD coronary arteriosclerosis, nicotine dependence, hyperlipidemia who presents for evaluation of shortness of breath with a green productive cough.  Her symptoms started about 2 days ago.  She continues to smoke tobacco.  She denies significant chest pain.  Denies significant dyspnea just progressively worsening over the last 2 days with this productive cough.  She has no other acute concerns or complaints at this time.  She tried albuterol and it helped a little bit.     Past Medical History:  has no past medical history on file.  Past 
4 Eyes Admission Assessment      I agree as the admission nurse that 2 RN's have performed a thorough Head to Toe Skin Assessment on the patient. ALL assessment sites listed below have been assessed on admission.                   Areas assessed by both nurses:   [x]   Head, Face, and Ears   [x]   Shoulders, Back, and Chest  [x]   Arms, Elbows, and Hands   [x]   Coccyx, Sacrum, and Ischum  [x]   Legs, Feet, and Heels                        Does the Patient have Skin Breakdown?  No         Marlo Prevention initiated:  No  Wound Care Orders initiated:  NA      [facility] nurse consulted for Pressure Injury (Stage 3,4, Unstageable, DTI, NWPT, and Complex wounds):  NA       Nurse 1 eSignature: Electronically signed by TRICIA BECERRA RN on 4/13/2025 at 6:50 AM     **SHARE this note so that the co-signing nurse is able to place an eSignature**     Nurse 2 eSignature: {Esignature:[id number]}   
Ambulated pt from chair to restroom and back with walker; tolerated well. She voided in hat, but only a small amount in container; unsure if pt partially voided directly into commode as pt flushed toilet before this RN able to assess. Bladder scan performed and PVR 44. Unable to strain urine from hat as pt placed toilet paper in container which soaked up urine.  
Bedside introduction complete. Patient denies any needs at this time. Updated whiteboard with RN and PCA name and number. Patient able to make needs known, using call light appropriately. Will continue to monitor and assess for needs and comfort.     
Checking on patient Q2H for nutrition needs, hygiene needs, comfort measures, mobility, fall risk interventions, and safe environment. All precautions and interventions in place. Educated patient on use of call light and telephone. Patient verbalizes understanding. Call light/telephone in reach.    
Day shift and this nurse attempted to encourage  patient to wear heart monitor, pt conts to refuse, 1-1 and education provided. Will cont to monitor and assess  
Dr. Ann was notified by [employer] serve that the patient had bright red blood per toilet he placed new order for H/H in AM . Pt did not know if it was from her rectum or in her urine MD and RN will continue to monitor her.   
Home O2 eval complete for a second attempt.   Pt ambulated on room air with an Spo2 of 87%. Pt placed on 3L nasal cannula. Spo2 improved to 92%. Pt placed in chair on 3L of O2 with Spo2 of 95%.  Pt qualifies for 3L of O2 continuously.   
Hospitalist made aware of refusal to ear heart monitor order dc'd  
PT AAO x4 with forgetful and impulsive. Pt' s daughter given face to face updates. Pt nohelia pain. Pt tolerating PO fluids. Pt resting fair. Pt requesting c-pap. NP TAWANNA nguyen updated. Pt resting fair. No further needs voiced at this time. Fall precautions in place. Bed alarm on. Call light within reach. Will continue care. Electronically signed by Jadyn Preciado RN on 4/16/2025 at 5:24 AM    
Patient IV removed. On 3L o2 nasal cannula connected to home o2. Discharge papers with patient. Patient taken of unit via wheelchair with this RN, PCA Tyeann and daughter. Patient and family denied needs or questions at discharge.     
Patient is resting in bed. Alert and oriented X 4. Denies pain at this time. IV capped and flushed. Assessment complete. Patient ambulates with walker. VSS stable at this time. All patient needs are met at this time. Fall precautions are in place. Call light is in reach.     
Patient report received at bedside with Night shift RN, Patient Alert and orient. Resting quietly in the bed, This RN updated White board with today's care teams name and Phone numbers. Patient call light within reach, Bed alarm on.     Electronically signed by JOSE PENN RN on 4/14/2025 at 11:38 AM   
Patient resting in chair this morning with no complaints of pain at this time. Scheduled morning medications given without complications. See eMAR for documentation of medication administration. Patient tolerated medications whole w/ water w/o complication. IV to the R forearm flushed w/o complication and is saline locked at this time. Alcohol cap in place. Patient remains on 3L of O2 at this time d/t desat to 87% when on RA. Patient to be discharged today; patient educated that she should not smoke when on oxygen and that no one in home can smoke d/t increase in oxygen demand.    Head to toe assessment completed and charted. See flowsheets for documentation.     Patient updated with plan of care for the shift, denies questions. Patient denies further physical/emotional needs at this time. Call light, telephone, and bed side table are within reach. Fall precautions in place. Will continue to monitor and assess.     Electronically signed by Dinorah Acosta RN on 4/16/2025 at 11:13 AM    
Patient to be discharged at 2030 this afternoon. Discharge instructions given to patient along with home medications, portable oxygen at bedside.    IV to be discontinued by night shift RNLoli. Loli updated on plan of care for remainder of patient hospitalization.    Electronically signed by Dinorah Acosta RN on 4/16/2025 at 7:25 PM    
Pt able to void 300 mL. Bladder scan at 41 mL PVR. Urine strained, no evidence of kidney stones or debris.  
Pt attempting to wander and elope, attempted x3 to call daughter with no answer, to update her about patient behaviors and refusals with no answer.     
Pt noted trying to get OOB, nurse went to bedside to assist. Sat pt at side of bed with walker in front of her. She said \"I am going to sit here a min, I said okay then she kicked her walker to the ground, refusing to answer any of my questions.   Assisted stand by to bathroom, when pt mmade it to the bathroom, she took her oxygen off when I tried to encourage her to put it back on se told this nurse to \"go to hell\" nurse in room listening to patient coughing. I called respiratory and suggested a inhaler or breathing treatment, pt was agreeable to this. Assited back to bed, bed alarm on still rfusing her oxygen. Place it at bedside informed respiratory of oxygen refusal. Informed hospitalist and charge of refusal.   
Pt phone is missing, nurse looked through out room and trash and dirty linen with no success in location. Attempted to call daughter cinthya at 2100 to ask if she took her phone with her but unable to get ahold of her.     
Pt sitting up in chair for majority of shift. Ambulating with walker and CGA, tolerating well. She remains oriented, but can be forgetful at times. She continues to have moist cough, but no dyspnea noted with ambulation. Continue care.  
Pt sitting up in chair this morning. She is now A&Ox4 and cooperative. She denies having any pain, nausea, numbness, or tingling. She has occasional moist, non-productive cough; LS with expiratory wheezes. PT/OT in room to work with pt. Continue care.  
Pt voided again, but missed hat completely. Bladder scan at 235 mL for PVR. Pt states she does not feel pressure in bladder.   
Pt was up   
Pulmonary Progress Note    Date of Admission: 4/12/2025   LOS: 3 days     Chief Complaint   Patient presents with    Shortness of Breath     Pt to ED with SOB whose onset was last night and increased today.  Green productive cough yesterday.  Pt denies fever.       Assessment/Plan:     Community-acquired pneumonia, Moraxella  - Ceftriaxone  - Wean oxygen goal saturation 90%    Centrilobular emphysema  - Stiolto, DuoNebs  - Acapella I-S        24 Hour Events/Subjective  Patient all with likely sundowning at night.  She is more confused today during my visit than on Monday.    ROS:   No nausea  No Vomiting  No chest pain      Intake/Output Summary (Last 24 hours) at 4/16/2025 0830  Last data filed at 4/16/2025 0638  Gross per 24 hour   Intake 650 ml   Output 1720 ml   Net -1070 ml         PHYSICAL EXAM:   Blood pressure (!) 152/61, pulse 56, temperature 98.1 °F (36.7 °C), temperature source Axillary, resp. rate 14, height 1.575 m (5' 2\"), weight 42.5 kg (93 lb 11.1 oz), SpO2 97%.'  Gen:  No acute distress.   Resp:  No crackles. No wheezes. No rhonchi. No dullness on percussion.  CV: Regular rate. Regular rhythm. No murmur or rub. No edema.   Neuro:  Moves all extremities  Psych: Disoriented, more confused and lethargic      Labs reviewed:  CBC:   Recent Labs     04/14/25  0440 04/15/25  0822   WBC 13.2* 12.2*   HGB 10.9* 12.0   HCT 32.7* 36.6   MCV 88.6 88.8    245     BMP:   Recent Labs     04/14/25  0440      K 4.4      CO2 22   BUN 26*   CREATININE 0.6     LIVER PROFILE: No results for input(s): \"AST\", \"ALT\", \"LIPASE\", \"AMYLASE\", \"BILIDIR\", \"BILITOT\", \"ALKPHOS\" in the last 72 hours.    Invalid input(s): \"ALB\"  PT/INR: No results for input(s): \"PROTIME\", \"INR\" in the last 72 hours.        Films:  Radiology Review:  Pertinent images / reports were reviewed as a part of this visit.            This note was transcribed using Dragon Dictation software. Please disregard any translational 
Pulmonary/Critical Care Progress Note    CC:  Follow-up:  Acute hypoxemic respiratory failure with SPO2 <90% on room air  Community acquired pneumonia  Moraxella pneumonia  Centrilobular emphysema    Subjective:  Remains on 3L NC  Feeling much better  Able to expectorate mucus  SOB improved    ROS  No fever  No chills  SOB improved   + productive cough    Intake/Output Summary (Last 24 hours) at 4/15/2025 1133  Last data filed at 4/15/2025 0957  Gross per 24 hour   Intake 2050 ml   Output 1 ml   Net 2049 ml         PHYSICAL EXAM:  Blood pressure (!) 177/94, pulse 80, temperature 97.3 °F (36.3 °C), temperature source Oral, resp. rate 17, height 1.575 m (5' 2\"), weight 42.5 kg (93 lb 11.1 oz), SpO2 95%.'  Gen: No distress. Well developed, well-nourished  Eyes: No scleral icterus. No conjunctival injection.   ENT: External appearance of ears and nose normal.  Neck: Trachea midline. No obvious mass. No visible thyroid enlargement     Respiratory: No crackles. No wheezes. No rhonchi. No accessory muscle use  Cardiovascular: Regular rate. Regular rhythm. No murmur or rub.  No edema  GI: Non-tender. Non-distended. No hernia. Bowel sounds present  Skin: Warm, dry, normal texture and turgor. No abnormalities on exposed extremities.   Musculoskeletal: No cyanosis. No clubbing. No joint deformity.    Neuro: Moves all four extremities.   Psychiatric:  Normal mood and affect; exhibits normal insight and judgement     Medications:    Scheduled Meds:   azithromycin  500 mg Oral Nightly    guaiFENesin  600 mg Oral BID    tiotropium-olodaterol  2 puff Inhalation Daily RT    cefTRIAXone (ROCEPHIN) IV  1,000 mg IntraVENous Q24H    ipratropium 0.5 mg-albuterol 2.5 mg  1 Dose Inhalation Q4H WA RT    sodium chloride flush  5-40 mL IntraVENous 2 times per day    nicotine  1 patch TransDERmal Daily    methylPREDNISolone  40 mg IntraVENous Daily       Continuous Infusions:   sodium chloride 250 mL (04/14/25 2113)       PRN 
Speech Therapy note regarding MD orders for Clinical Evaluation of Swallowing    SLP attempt 4/14/20225  attempt 1535 pm;  Upon SLP entry to room   PCA there and reports pt is in the shower.     Plan: Will re-attempt 4/15/2025     Signed   Roxanna Robins MS,CCC,SLP [id number]  Speech and Language Pathologist  4/14/2025 at 1542 pm  
Spoke to daughter about patient behavior, she returned my call about 0645, daughter is suggesting a low dose ativan to see if that will keep her more compliant. Passed the information along to day shift since it was 7 am before I got off the phone with daughter.     
[facility]: [facility] 3W ORTHOPEDICS  DYSPHAGIA BEDSIDE SWALLOW EVALUATION     Patient: Altagracia Gottlieb   : 1935   MRN: [id number]      Evaluation Date: 4/15/2025     Admitting Diagnosis:   COPD exacerbation (HCC) [J44.1]  Acute respiratory failure with hypoxia [J96.01]  Pneumonia of right lower lobe due to infectious organism [J18.9]  Acute hypoxemic respiratory failure [J96.01]   has no past medical history on file.   has a past surgical history that includes hip surgery (Right, 2024).  Allergies:   Allergies   Allergen Reactions    Gluten Diarrhea     Dysphagia History including instrumental assessment: N/A  GI history: N/A  ENT history: N/A  Baseline/Prior Level of Function: Living Status: Lives with daughter and son-in-law; Baseline diet: Regular/thin    Onset Date: 2025        Reason for referral: SLP evaluation orders received due to new diagnosis of PNA                         CURRENT ENCOUNTER DIAGNOSTICS/COURSE OF ADMISSION     H&P:   89f with history of copd, continued tobacco abuse, presents with 2 day history of cough, increased sob. Denies fever, chills difficulty swallowing solids or liquids, chest pain, palpitations, lightheadedness or dizziness. She denies history of O2 dependence, presents hypoxemic requiring 2lpm NC O2. She is easily awakened by name, is oriented and is coherently and appropriately responsive in no respiratory distress.     Current Consultations: Pulmonology; Urology     Imaging:  CT CHEST 25  IMPRESSION:  1. No acute pulmonary artery embolism.  2. No evidence of aortic aneurysm or dissection.  Cardiac silhouette not  enlarged.  No evidence of pericardial disease.  3. Severe emphysema with right lower lobe infiltrate/atelectasis.  4. Age indeterminate T12 compression fracture.    CHEST X-RAY 25  IMPRESSION:  No evidence of acute cardiopulmonary disease.    Current Diet Level (prior to evaluation): Regular texture, Thin liquids    Respiratory Status: O2 via 
assistance  Comment: no overt LOB;   Comments:assist for o2 line and walker management   Balance:  Static Sitting Balance: good(+): independent with high level dynamic balance in unsupported position  Dynamic Sitting Balance: good(+): independent with high level dynamic balance in unsupported position  Static Standing Balance: fair (+): maintains balance at SBA/supervision without use of UE support  Dynamic Standing Balance: fair: maintains balance at CGA without use of UE support  Comments:     Cognition  WFL  Orientation:    alert and oriented x 4     Education  Barriers To Learning: none  Patient Education: patient educated on goals, OT role and benefits, plan of care, transfer training  Learning Assessment:  patient verbalizes understanding, would benefit from continued reinforcement  Safety Interventions: patient at risk for falls, call light within reach, patient left in chair, chair alarm in place, gait belt, and nurse notified    Plan  Frequency: 3-5 x/week  Current Treatment Recommendations: strengthening, balance training, functional mobility training, transfer training, gait training, endurance training, patient/caregiver education, ADL/self-care training, safety education, and equipment evaluation/education    Goals  Patient Goals: return to PLOF   Short Term Goals:  Time Frame: By acute discharge  Patient will complete lower body dressing with modified independent   Patient will complete upper body dressing with modified independent  Patient will complete toileting with modified independent  Patient will complete bathing with modified independent  Patient will complete grooming with modified independent  Patient will complete functional transfers with modified independent  Patient will complete functional mobility with modified independent    Above goals reviewed on 4/15/2025.  All goals are ongoing at this time unless indicated above.       Therapy Session Time     Individual Group Co-treatment   Time

## 2025-04-20 ENCOUNTER — TRANSCRIBE ORDERS (OUTPATIENT)
Dept: ADMINISTRATIVE | Age: 89
End: 2025-04-20

## 2025-04-20 DIAGNOSIS — N20.0 KIDNEY STONE: Primary | ICD-10-CM

## 2025-07-16 ENCOUNTER — HOSPITAL ENCOUNTER (EMERGENCY)
Age: 89
Discharge: HOME | End: 2025-07-17
Payer: MEDICARE

## 2025-07-16 VITALS
RESPIRATION RATE: 14 BRPM | TEMPERATURE: 97.7 F | OXYGEN SATURATION: 90 % | SYSTOLIC BLOOD PRESSURE: 157 MMHG | DIASTOLIC BLOOD PRESSURE: 83 MMHG | HEART RATE: 74 BPM

## 2025-07-16 DIAGNOSIS — N39.0 URINARY TRACT INFECTION WITH HEMATURIA, SITE UNSPECIFIED: Primary | ICD-10-CM

## 2025-07-16 DIAGNOSIS — R03.0 ELEVATED BLOOD PRESSURE READING IN OFFICE WITHOUT DIAGNOSIS OF HYPERTENSION: ICD-10-CM

## 2025-07-16 DIAGNOSIS — R80.9 PROTEINURIA, UNSPECIFIED TYPE: ICD-10-CM

## 2025-07-16 DIAGNOSIS — R31.9 URINARY TRACT INFECTION WITH HEMATURIA, SITE UNSPECIFIED: Primary | ICD-10-CM

## 2025-07-16 DIAGNOSIS — R93.89 ABNORMAL FINDING ON CT SCAN: ICD-10-CM

## 2025-07-16 LAB
ALBUMIN SERPL-MCNC: 4 G/DL (ref 3.4–5)
ALBUMIN/GLOB SERPL: 1.5 {RATIO} (ref 1.1–2.2)
ALP SERPL-CCNC: 70 U/L (ref 40–129)
ALT SERPL-CCNC: 11 U/L (ref 10–40)
ANION GAP SERPL CALCULATED.3IONS-SCNC: 13 MMOL/L (ref 3–16)
AST SERPL-CCNC: 25 U/L (ref 15–37)
BACTERIA URNS QL MICRO: ABNORMAL /HPF
BASOPHILS # BLD: 0.1 K/UL (ref 0–0.2)
BASOPHILS NFR BLD: 0.7 %
BILIRUB SERPL-MCNC: 0.3 MG/DL (ref 0–1)
BILIRUB UR QL STRIP.AUTO: ABNORMAL
BUN SERPL-MCNC: 24 MG/DL (ref 7–20)
CALCIUM SERPL-MCNC: 8.9 MG/DL (ref 8.3–10.6)
CHARACTER UR: ABNORMAL
CHLORIDE SERPL-SCNC: 104 MMOL/L (ref 99–110)
CLARITY UR: ABNORMAL
CO2 SERPL-SCNC: 25 MMOL/L (ref 21–32)
COLOR UR: ABNORMAL
CREAT SERPL-MCNC: 0.8 MG/DL (ref 0.6–1.2)
DEPRECATED RDW RBC AUTO: 17.6 % (ref 12.4–15.4)
EOSINOPHIL # BLD: 0.1 K/UL (ref 0–0.6)
EOSINOPHIL NFR BLD: 1.3 %
EPI CELLS #/AREA URNS AUTO: 3 /HPF (ref 0–5)
GFR SERPLBLD CREATININE-BSD FMLA CKD-EPI: 70 ML/MIN/{1.73_M2}
GLUCOSE SERPL-MCNC: 72 MG/DL (ref 70–99)
GLUCOSE UR STRIP.AUTO-MCNC: NEGATIVE MG/DL
HCT VFR BLD AUTO: 39 % (ref 36–48)
HGB BLD-MCNC: 12.9 G/DL (ref 12–16)
HGB UR QL STRIP.AUTO: ABNORMAL
HYALINE CASTS #/AREA URNS AUTO: 0 /LPF (ref 0–8)
KETONES UR STRIP.AUTO-MCNC: ABNORMAL MG/DL
LEUKOCYTE ESTERASE UR QL STRIP.AUTO: ABNORMAL
LYMPHOCYTES # BLD: 2.1 K/UL (ref 1–5.1)
LYMPHOCYTES NFR BLD: 27 %
MCH RBC QN AUTO: 29.4 PG (ref 26–34)
MCHC RBC AUTO-ENTMCNC: 33.2 G/DL (ref 31–36)
MCV RBC AUTO: 88.7 FL (ref 80–100)
MONOCYTES # BLD: 0.8 K/UL (ref 0–1.3)
MONOCYTES NFR BLD: 10.8 %
NEUTROPHILS # BLD: 4.6 K/UL (ref 1.7–7.7)
NEUTROPHILS NFR BLD: 60.2 %
NITRITE UR QL STRIP.AUTO: NEGATIVE
PH UR STRIP.AUTO: 6 [PH] (ref 5–8)
PLATELET # BLD AUTO: 214 K/UL (ref 135–450)
PMV BLD AUTO: 8.3 FL (ref 5–10.5)
POTASSIUM SERPL-SCNC: 4.3 MMOL/L (ref 3.5–5.1)
PROT SERPL-MCNC: 6.7 G/DL (ref 6.4–8.2)
PROT UR STRIP.AUTO-MCNC: >=300 MG/DL
RBC # BLD AUTO: 4.4 M/UL (ref 4–5.2)
RBC CLUMPS #/AREA URNS AUTO: >100 /HPF (ref 0–4)
SODIUM SERPL-SCNC: 142 MMOL/L (ref 136–145)
SP GR UR STRIP.AUTO: 1.02 (ref 1–1.03)
UA COMPLETE W REFLEX CULTURE PNL UR: YES
UA DIPSTICK W REFLEX MICRO PNL UR: YES
URN SPEC COLLECT METH UR: ABNORMAL
UROBILINOGEN UR STRIP-ACNC: 0.2 E.U./DL
WBC # BLD AUTO: 7.7 K/UL (ref 4–11)
WBC #/AREA URNS AUTO: 82 /HPF (ref 0–5)

## 2025-07-16 PROCEDURE — 87086 URINE CULTURE/COLONY COUNT: CPT

## 2025-07-16 PROCEDURE — 85025 COMPLETE CBC W/AUTO DIFF WBC: CPT

## 2025-07-16 PROCEDURE — 99285 EMERGENCY DEPT VISIT HI MDM: CPT

## 2025-07-16 PROCEDURE — 36415 COLL VENOUS BLD VENIPUNCTURE: CPT

## 2025-07-16 PROCEDURE — 80053 COMPREHEN METABOLIC PANEL: CPT

## 2025-07-16 PROCEDURE — 81001 URINALYSIS AUTO W/SCOPE: CPT

## 2025-07-16 ASSESSMENT — PAIN - FUNCTIONAL ASSESSMENT: PAIN_FUNCTIONAL_ASSESSMENT: NONE - DENIES PAIN

## 2025-07-16 ASSESSMENT — LIFESTYLE VARIABLES
HOW OFTEN DO YOU HAVE A DRINK CONTAINING ALCOHOL: NEVER
HOW MANY STANDARD DRINKS CONTAINING ALCOHOL DO YOU HAVE ON A TYPICAL DAY: PATIENT DOES NOT DRINK

## 2025-07-17 ENCOUNTER — APPOINTMENT (OUTPATIENT)
Dept: CT IMAGING | Age: 89
End: 2025-07-17
Payer: MEDICARE

## 2025-07-17 LAB
BACTERIA UR CULT: NORMAL
FLUAV + FLUBV AG NOSE IA.RAPID: NOT DETECTED
FLUAV + FLUBV AG NOSE IA.RAPID: NOT DETECTED
SARS-COV-2 RDRP RESP QL NAA+PROBE: NOT DETECTED

## 2025-07-17 PROCEDURE — 2580000003 HC RX 258: Performed by: NURSE PRACTITIONER

## 2025-07-17 PROCEDURE — 6360000002 HC RX W HCPCS: Performed by: NURSE PRACTITIONER

## 2025-07-17 PROCEDURE — 2500000003 HC RX 250 WO HCPCS: Performed by: NURSE PRACTITIONER

## 2025-07-17 PROCEDURE — 6360000004 HC RX CONTRAST MEDICATION: Performed by: NURSE PRACTITIONER

## 2025-07-17 PROCEDURE — 74177 CT ABD & PELVIS W/CONTRAST: CPT

## 2025-07-17 PROCEDURE — 87635 SARS-COV-2 COVID-19 AMP PRB: CPT

## 2025-07-17 PROCEDURE — 87502 INFLUENZA DNA AMP PROBE: CPT

## 2025-07-17 RX ORDER — 0.9 % SODIUM CHLORIDE 0.9 %
500 INTRAVENOUS SOLUTION INTRAVENOUS ONCE
Status: COMPLETED | OUTPATIENT
Start: 2025-07-17 | End: 2025-07-17

## 2025-07-17 RX ORDER — IOPAMIDOL 755 MG/ML
75 INJECTION, SOLUTION INTRAVASCULAR
Status: COMPLETED | OUTPATIENT
Start: 2025-07-17 | End: 2025-07-17

## 2025-07-17 RX ORDER — CEFUROXIME AXETIL 500 MG/1
500 TABLET ORAL 2 TIMES DAILY
Qty: 14 TABLET | Refills: 0 | Status: SHIPPED | OUTPATIENT
Start: 2025-07-17 | End: 2025-07-24

## 2025-07-17 RX ADMIN — WATER 1000 MG: 1 INJECTION INTRAMUSCULAR; INTRAVENOUS; SUBCUTANEOUS at 00:28

## 2025-07-17 RX ADMIN — IOPAMIDOL 75 ML: 755 INJECTION, SOLUTION INTRAVENOUS at 00:26

## 2025-07-17 RX ADMIN — SODIUM CHLORIDE 500 ML: 0.9 INJECTION, SOLUTION INTRAVENOUS at 00:27

## 2025-07-17 NOTE — DISCHARGE INSTRUCTIONS
Return to the emergency department for new or worsening symptoms including, limited to, developing fever, chills, sweats, inability to tolerate food or drink, inability to urinate, inability to tolerate oral intake, or other symptoms/concerns      Medication as prescribed.  Complete the full course of the antibiotic.      Follow-up with the [facility] residency clinic in order to establish a PCP for your future nonemergent medical needs.  Also on tomorrow call Dr. Abreu who is the urologist to schedule an appointment for evaluation of hematuria and proteinuria.

## 2025-07-17 NOTE — ED NOTES
Provider order placed for patient's discharge. Provider reviewed decision to discharge with the patient. Discharge paperwork and any prescriptions were reviewed with the patient. Patient verbalized understanding of discharge education and any prescriptions and has no further questions or further needs at this time. Patient left with all personal belongings and was stable upon departure. Patient thanked for choosing [facility] and informed to return should any need arise.

## 2025-07-17 NOTE — ED PROVIDER NOTES
[facility] EMERGENCY DEPARTMENT  EMERGENCY DEPARTMENT ENCOUNTER        Pt Name: Altagracia Gottlieb  MRN: [id number]  Birthdate 1935  Date of evaluation: 7/16/2025  Provider: NADEGE Salter CNP  PCP: No primary care provider on file.  Note Started: 4:54 AM EDT 7/17/25      MIGUEL. I have evaluated this patient.        CHIEF COMPLAINT       Chief Complaint   Patient presents with    Hematuria     Patient to the ER for cold symptoms as well as blood in her urine before she came in. Denies shortness of breath.       HISTORY OF PRESENT ILLNESS: 1 or more Elements     History From: Patient  Limitations to history : None    Altagracia Gottlieb is a 89 y.o. nontoxic, well-appearing female with a medical history including, limited to, acute respiratory failure with hypoxia, pneumonia, COPD, and closed fracture of neck of right femur and right hip, who presents to the emerged part for evaluation of acute onset at 1700 hrs. with hematuria and cold symptoms including rhinorrhea and chest congestion.  Denies chest pain, nausea, vomiting, lightness, dizziness, presyncope, shortness of breath, measured fevers, chills, sweats, cough, change in ability to smell/taste, hemoptysis, leg/calf pain or swelling, headache, body aches, abdominal pain, diarrhea, urinary frequency, urgency, dysuria, retention, other symptoms/concerns.  She is not anticoagulated.    Nursing Notes were all reviewed and agreed with or any disagreements were addressed in the HPI.    REVIEW OF SYSTEMS :      Review of Systems   Constitutional:  Negative for chills, diaphoresis, fatigue and fever.   HENT:  Positive for congestion and rhinorrhea. Negative for sore throat.    Eyes:  Negative for pain and visual disturbance.   Respiratory:  Negative for cough and shortness of breath.    Cardiovascular:  Negative for chest pain and leg swelling.   Gastrointestinal:  Negative for abdominal pain, anal bleeding, diarrhea, nausea and vomiting.

## 2025-07-19 ASSESSMENT — ENCOUNTER SYMPTOMS
NAUSEA: 0
ABDOMINAL PAIN: 0
ANAL BLEEDING: 0
SORE THROAT: 0
SHORTNESS OF BREATH: 0
COUGH: 0
DIARRHEA: 0
BACK PAIN: 0
EYE PAIN: 0
VOMITING: 0
RHINORRHEA: 1

## 2025-08-23 ENCOUNTER — APPOINTMENT (OUTPATIENT)
Dept: GENERAL RADIOLOGY | Age: 89
End: 2025-08-23
Payer: MEDICARE

## 2025-08-23 ENCOUNTER — HOSPITAL ENCOUNTER (EMERGENCY)
Age: 89
Discharge: HOME | End: 2025-08-24
Attending: EMERGENCY MEDICINE
Payer: MEDICARE

## 2025-08-23 DIAGNOSIS — J44.1 COPD EXACERBATION (HCC): ICD-10-CM

## 2025-08-23 DIAGNOSIS — J40 BRONCHITIS: Primary | ICD-10-CM

## 2025-08-23 LAB
ALBUMIN SERPL-MCNC: 4.2 G/DL (ref 3.4–5)
ALBUMIN/GLOB SERPL: 1.4 {RATIO} (ref 1.1–2.2)
ALP SERPL-CCNC: 76 U/L (ref 40–129)
ALT SERPL-CCNC: 10 U/L (ref 10–40)
ANION GAP SERPL CALCULATED.3IONS-SCNC: 11 MMOL/L (ref 3–16)
AST SERPL-CCNC: 25 U/L (ref 15–37)
BASE EXCESS BLDV CALC-SCNC: 3 MMOL/L
BASOPHILS # BLD: 0 K/UL (ref 0–0.2)
BASOPHILS NFR BLD: 0.4 %
BILIRUB SERPL-MCNC: 0.4 MG/DL (ref 0–1)
BUN SERPL-MCNC: 34 MG/DL (ref 7–20)
CALCIUM SERPL-MCNC: 9.6 MG/DL (ref 8.3–10.6)
CHLORIDE SERPL-SCNC: 104 MMOL/L (ref 99–110)
CO2 BLDV-SCNC: 31 MMOL/L
CO2 SERPL-SCNC: 25 MMOL/L (ref 21–32)
COHGB MFR BLDV: 2.8 %
CREAT SERPL-MCNC: 0.7 MG/DL (ref 0.6–1.2)
DEPRECATED RDW RBC AUTO: 18 % (ref 12.4–15.4)
EOSINOPHIL # BLD: 0.1 K/UL (ref 0–0.6)
EOSINOPHIL NFR BLD: 0.8 %
FLUAV + FLUBV AG NOSE IA.RAPID: NOT DETECTED
FLUAV + FLUBV AG NOSE IA.RAPID: NOT DETECTED
GFR SERPLBLD CREATININE-BSD FMLA CKD-EPI: 83 ML/MIN/{1.73_M2}
GLUCOSE SERPL-MCNC: 105 MG/DL (ref 70–99)
HCO3 BLDV-SCNC: 30 MMOL/L (ref 23–29)
HCT VFR BLD AUTO: 40.8 % (ref 36–48)
HGB BLD-MCNC: 13.5 G/DL (ref 12–16)
LACTATE BLDV-SCNC: 1.2 MMOL/L (ref 0.4–2)
LYMPHOCYTES # BLD: 1.3 K/UL (ref 1–5.1)
LYMPHOCYTES NFR BLD: 11.6 %
MCH RBC QN AUTO: 29.2 PG (ref 26–34)
MCHC RBC AUTO-ENTMCNC: 33 G/DL (ref 31–36)
MCV RBC AUTO: 88.4 FL (ref 80–100)
METHGB MFR BLDV: 0.4 %
MONOCYTES # BLD: 1 K/UL (ref 0–1.3)
MONOCYTES NFR BLD: 9.1 %
NEUTROPHILS # BLD: 9 K/UL (ref 1.7–7.7)
NEUTROPHILS NFR BLD: 78.1 %
O2 THERAPY: ABNORMAL
PCO2 BLDV: 51.6 MMHG (ref 40–50)
PH BLDV: 7.37 [PH] (ref 7.35–7.45)
PLATELET # BLD AUTO: 261 K/UL (ref 135–450)
PMV BLD AUTO: 8.1 FL (ref 5–10.5)
PO2 BLDV: <30 MMHG
POTASSIUM SERPL-SCNC: 4.4 MMOL/L (ref 3.5–5.1)
PROT SERPL-MCNC: 7.2 G/DL (ref 6.4–8.2)
RBC # BLD AUTO: 4.62 M/UL (ref 4–5.2)
SAO2 % BLDV: 23 %
SARS-COV-2 RDRP RESP QL NAA+PROBE: NOT DETECTED
SODIUM SERPL-SCNC: 140 MMOL/L (ref 136–145)
TROPONIN, HIGH SENSITIVITY: 17 NG/L (ref 0–14)
TROPONIN, HIGH SENSITIVITY: 17 NG/L (ref 0–14)
WBC # BLD AUTO: 11.6 K/UL (ref 4–11)

## 2025-08-23 PROCEDURE — 6370000000 HC RX 637 (ALT 250 FOR IP): Performed by: EMERGENCY MEDICINE

## 2025-08-23 PROCEDURE — 96374 THER/PROPH/DIAG INJ IV PUSH: CPT

## 2025-08-23 PROCEDURE — 36415 COLL VENOUS BLD VENIPUNCTURE: CPT

## 2025-08-23 PROCEDURE — 80053 COMPREHEN METABOLIC PANEL: CPT

## 2025-08-23 PROCEDURE — 94640 AIRWAY INHALATION TREATMENT: CPT

## 2025-08-23 PROCEDURE — 87635 SARS-COV-2 COVID-19 AMP PRB: CPT

## 2025-08-23 PROCEDURE — 71045 X-RAY EXAM CHEST 1 VIEW: CPT

## 2025-08-23 PROCEDURE — 93005 ELECTROCARDIOGRAM TRACING: CPT

## 2025-08-23 PROCEDURE — 85025 COMPLETE CBC W/AUTO DIFF WBC: CPT

## 2025-08-23 PROCEDURE — 6360000002 HC RX W HCPCS: Performed by: EMERGENCY MEDICINE

## 2025-08-23 PROCEDURE — 83605 ASSAY OF LACTIC ACID: CPT

## 2025-08-23 PROCEDURE — 2500000003 HC RX 250 WO HCPCS

## 2025-08-23 PROCEDURE — 82803 BLOOD GASES ANY COMBINATION: CPT

## 2025-08-23 PROCEDURE — 84484 ASSAY OF TROPONIN QUANT: CPT

## 2025-08-23 PROCEDURE — 99285 EMERGENCY DEPT VISIT HI MDM: CPT

## 2025-08-23 PROCEDURE — 87502 INFLUENZA DNA AMP PROBE: CPT

## 2025-08-23 PROCEDURE — 94761 N-INVAS EAR/PLS OXIMETRY MLT: CPT

## 2025-08-23 RX ORDER — IPRATROPIUM BROMIDE AND ALBUTEROL SULFATE 2.5; .5 MG/3ML; MG/3ML
2 SOLUTION RESPIRATORY (INHALATION) ONCE
Status: COMPLETED | OUTPATIENT
Start: 2025-08-23 | End: 2025-08-23

## 2025-08-23 RX ORDER — METHYLPREDNISOLONE SODIUM SUCCINATE 125 MG/2ML
125 INJECTION INTRAMUSCULAR; INTRAVENOUS ONCE
Status: COMPLETED | OUTPATIENT
Start: 2025-08-23 | End: 2025-08-23

## 2025-08-23 RX ORDER — WATER 10 ML/10ML
INJECTION INTRAMUSCULAR; INTRAVENOUS; SUBCUTANEOUS
Status: COMPLETED
Start: 2025-08-23 | End: 2025-08-23

## 2025-08-23 RX ADMIN — IPRATROPIUM BROMIDE AND ALBUTEROL SULFATE 2 DOSE: 2.5; .5 SOLUTION RESPIRATORY (INHALATION) at 23:05

## 2025-08-23 RX ADMIN — METHYLPREDNISOLONE SODIUM SUCCINATE 125 MG: 125 INJECTION INTRAMUSCULAR; INTRAVENOUS at 23:21

## 2025-08-23 RX ADMIN — WATER 2 ML: 1 INJECTION INTRAMUSCULAR; INTRAVENOUS; SUBCUTANEOUS at 23:22

## 2025-08-23 ASSESSMENT — PAIN SCALES - GENERAL: PAINLEVEL_OUTOF10: 4

## 2025-08-23 ASSESSMENT — PAIN - FUNCTIONAL ASSESSMENT: PAIN_FUNCTIONAL_ASSESSMENT: 0-10

## 2025-08-23 ASSESSMENT — PAIN DESCRIPTION - LOCATION: LOCATION: CHEST

## 2025-08-24 VITALS
OXYGEN SATURATION: 94 % | HEIGHT: 62 IN | TEMPERATURE: 98.2 F | WEIGHT: 99.87 LBS | DIASTOLIC BLOOD PRESSURE: 70 MMHG | SYSTOLIC BLOOD PRESSURE: 145 MMHG | HEART RATE: 91 BPM | BODY MASS INDEX: 18.38 KG/M2 | RESPIRATION RATE: 25 BRPM

## 2025-08-24 LAB
EKG ATRIAL RATE: 91 BPM
EKG DIAGNOSIS: NORMAL
EKG P AXIS: 99 DEGREES
EKG P-R INTERVAL: 148 MS
EKG Q-T INTERVAL: 340 MS
EKG QRS DURATION: 64 MS
EKG QTC CALCULATION (BAZETT): 418 MS
EKG R AXIS: 81 DEGREES
EKG T AXIS: 91 DEGREES
EKG VENTRICULAR RATE: 91 BPM

## 2025-08-24 PROCEDURE — 6370000000 HC RX 637 (ALT 250 FOR IP): Performed by: EMERGENCY MEDICINE

## 2025-08-24 RX ORDER — AZITHROMYCIN 500 MG/1
500 TABLET, FILM COATED ORAL ONCE
Status: COMPLETED | OUTPATIENT
Start: 2025-08-24 | End: 2025-08-24

## 2025-08-24 RX ORDER — PREDNISONE 50 MG/1
50 TABLET ORAL DAILY
Qty: 5 TABLET | Refills: 0 | Status: SHIPPED | OUTPATIENT
Start: 2025-08-24 | End: 2025-08-29

## 2025-08-24 RX ORDER — AZITHROMYCIN 250 MG/1
TABLET, FILM COATED ORAL
Qty: 6 TABLET | Refills: 0 | Status: SHIPPED | OUTPATIENT
Start: 2025-08-24 | End: 2025-09-03

## 2025-08-24 RX ORDER — AMOXICILLIN AND CLAVULANATE POTASSIUM 250; 125 MG/1; MG/1
1 TABLET, FILM COATED ORAL ONCE
Status: COMPLETED | OUTPATIENT
Start: 2025-08-24 | End: 2025-08-24

## 2025-08-24 RX ADMIN — AZITHROMYCIN 500 MG: 500 TABLET, FILM COATED ORAL at 01:54

## 2025-08-24 RX ADMIN — AMOXICILLIN AND CLAVULANATE POTASSIUM 1 TABLET: 250; 125 TABLET, FILM COATED ORAL at 01:54

## (undated) DEVICE — SOLUTION IRRIG 1000ML 0.9% SOD CHL USP POUR PLAS BTL

## (undated) DEVICE — GOWN SIRUS NONREIN XL W/TWL: Brand: MEDLINE INDUSTRIES, INC.

## (undated) DEVICE — ELECTRODE PT RET AD L9FT HI MOIST COND ADH HYDRGEL CORDED

## (undated) DEVICE — GLOVE SURG SZ 65 L12IN FNGR THK79MIL GRN LTX FREE

## (undated) DEVICE — THREADED GUIDE WIRE

## (undated) DEVICE — TRANSFER SET 3": Brand: MEDLINE INDUSTRIES, INC.

## (undated) DEVICE — HIP PINNING: Brand: MEDLINE INDUSTRIES, INC.

## (undated) DEVICE — 3M™ COBAN™ NL STERILE NON-LATEX SELF-ADHERENT WRAP, 2084S, 4 IN X 5 YD (10 CM X 4,5 M), 18 ROLLS/CASE: Brand: 3M™ COBAN™

## (undated) DEVICE — GLOVE SURG SZ 8 CRM LTX FREE POLYISOPRENE POLYMER BEAD ANTI

## (undated) DEVICE — GLOVE SURG SZ 65 THK91MIL LTX FREE SYN POLYISOPRENE

## (undated) DEVICE — GOWN SIRUS NONREIN LG W/TWL: Brand: MEDLINE INDUSTRIES, INC.